# Patient Record
Sex: FEMALE | NOT HISPANIC OR LATINO | Employment: FULL TIME | URBAN - METROPOLITAN AREA
[De-identification: names, ages, dates, MRNs, and addresses within clinical notes are randomized per-mention and may not be internally consistent; named-entity substitution may affect disease eponyms.]

---

## 2017-05-23 DIAGNOSIS — J06.9 ACUTE URI: ICD-10-CM

## 2017-05-24 RX ORDER — FLUTICASONE PROPIONATE 50 MCG
SPRAY, SUSPENSION (ML) NASAL
Qty: 16 G | Refills: 5 | Status: SHIPPED | OUTPATIENT
Start: 2017-05-24 | End: 2020-11-10

## 2017-06-27 DIAGNOSIS — B37.9 YEAST INFECTION: ICD-10-CM

## 2017-06-27 RX ORDER — FLUCONAZOLE 100 MG/1
TABLET ORAL
Qty: 3 TABLET | Refills: 6 | Status: SHIPPED | OUTPATIENT
Start: 2017-06-27 | End: 2017-08-10

## 2017-08-07 DIAGNOSIS — G43.009 NONINTRACTABLE MIGRAINE, UNSPECIFIED MIGRAINE TYPE: ICD-10-CM

## 2017-08-07 RX ORDER — SUMATRIPTAN 50 MG/1
TABLET, FILM COATED ORAL
Qty: 30 TABLET | Refills: 5 | Status: ON HOLD | OUTPATIENT
Start: 2017-08-07 | End: 2021-03-01

## 2017-08-10 ENCOUNTER — OFFICE VISIT (OUTPATIENT)
Dept: FAMILY MEDICINE CLINIC | Facility: CLINIC | Age: 22
End: 2017-08-10

## 2017-08-10 VITALS
BODY MASS INDEX: 26.42 KG/M2 | DIASTOLIC BLOOD PRESSURE: 78 MMHG | WEIGHT: 168.3 LBS | SYSTOLIC BLOOD PRESSURE: 126 MMHG | HEART RATE: 94 BPM | OXYGEN SATURATION: 91 % | HEIGHT: 67 IN | TEMPERATURE: 97.9 F

## 2017-08-10 DIAGNOSIS — R10.13 EPIGASTRIC PAIN: Primary | ICD-10-CM

## 2017-08-10 PROCEDURE — 99214 OFFICE O/P EST MOD 30 MIN: CPT | Performed by: NURSE PRACTITIONER

## 2017-08-10 NOTE — PROGRESS NOTES
Subjective   Chloe Limon is a 22 y.o. female.     Abdominal Pain   This is a new problem. Episode onset: 3 days ago. The onset quality is sudden. The problem occurs constantly. The problem has been unchanged. The pain is located in the epigastric region. The pain is at a severity of 8/10. The quality of the pain is sharp and a sensation of fullness. The abdominal pain radiates to the epigastric region. Associated symptoms include belching (more this am). Pertinent negatives include no constipation, diarrhea, flatus, nausea or vomiting. Nothing aggravates the pain. The pain is relieved by nothing. She has tried antacids for the symptoms. The treatment provided no relief. was told she had a dueodenal ulcer afte having a cholecystectomy     I have reviewed the patient's medical history in detail and updated the computerized patient record.    The following portions of the patient's history were reviewed and updated as appropriate: allergies, current medications, past family history, past medical history, past social history, past surgical history and problem list.      Current Outpatient Prescriptions:   •  fluticasone (FLONASE) 50 MCG/ACT nasal spray, INHALE ONE SPRAY INTO EACH NOSTRIL TWICE DAILY, Disp: 16 g, Rfl: 5  •  SPRINTEC 28 0.25-35 MG-MCG per tablet, TAKE 1 TABLET BY MOUTH EVERY DAY, Disp: , Rfl: 12  •  SUMAtriptan (IMITREX) 50 MG tablet, TAKE ONE TABLET BY MOUTH AT ONSET OF HEADACHE MAY REPEAT DOSE ONE TIME IN TWO HOURS IF HEADACHE NOT RELIEVED, Disp: 30 tablet, Rfl: 5     Review of Systems   Constitutional: Negative.    Respiratory: Negative.    Cardiovascular: Negative.    Gastrointestinal: Positive for abdominal pain. Negative for abdominal distention, anal bleeding, constipation, diarrhea, flatus, nausea and vomiting.   Skin: Negative.    Neurological: Negative.        Objective      Current Outpatient Prescriptions:   •  fluticasone (FLONASE) 50 MCG/ACT nasal spray, INHALE ONE SPRAY INTO EACH  NOSTRIL TWICE DAILY, Disp: 16 g, Rfl: 5  •  SPRINTEC 28 0.25-35 MG-MCG per tablet, TAKE 1 TABLET BY MOUTH EVERY DAY, Disp: , Rfl: 12  •  SUMAtriptan (IMITREX) 50 MG tablet, TAKE ONE TABLET BY MOUTH AT ONSET OF HEADACHE MAY REPEAT DOSE ONE TIME IN TWO HOURS IF HEADACHE NOT RELIEVED, Disp: 30 tablet, Rfl: 5     Physical Exam   Constitutional: She is oriented to person, place, and time. She appears well-developed and well-nourished.   Cardiovascular: Normal rate, regular rhythm and normal heart sounds.    Pulmonary/Chest: Effort normal and breath sounds normal.   Abdominal: Soft. Bowel sounds are normal. There is no splenomegaly or hepatomegaly. There is tenderness in the epigastric area. There is no rigidity, no rebound, no guarding, no CVA tenderness, no tenderness at McBurney's point and negative Chahal's sign.   Neurological: She is alert and oriented to person, place, and time.   Skin: Skin is warm and dry.   Psychiatric:   No acute distress   Vitals reviewed.      Assessment/Plan   Chloe was seen today for abdominal pain and abnormal imaging.    Diagnoses and all orders for this visit:    Epigastric pain  -     Cancel: Ambulatory referral for Screening EGD  -     FL upper gi w air contrast and kub       1. Positive for epigastric tenderness. History of cholecystectomy was told at that time she had a perforated duodenal ulcer. I am sending her for an UGI with small bowel follow through. Will adjust plan of care once the results are in.  2. Follow up as needed.

## 2017-08-15 DIAGNOSIS — L74.510 HYPERHIDROSIS OF AXILLA: ICD-10-CM

## 2017-08-16 RX ORDER — ALUMINUM CHLORIDE 20 %
SOLUTION, NON-ORAL TOPICAL
Qty: 60 ML | Refills: 5 | Status: SHIPPED | OUTPATIENT
Start: 2017-08-16 | End: 2018-09-28 | Stop reason: SDUPTHER

## 2017-08-21 ENCOUNTER — HOSPITAL ENCOUNTER (OUTPATIENT)
Dept: GENERAL RADIOLOGY | Facility: HOSPITAL | Age: 22
Discharge: HOME | End: 2017-08-21
Admitting: NURSE PRACTITIONER

## 2017-08-21 PROCEDURE — 74247: CPT

## 2017-08-24 ENCOUNTER — TELEPHONE (OUTPATIENT)
Dept: FAMILY MEDICINE CLINIC | Facility: CLINIC | Age: 22
End: 2017-08-24

## 2017-08-24 NOTE — TELEPHONE ENCOUNTER
I called Chloe back and discussed the results of her UGI. I have advised her to follow up with the gastroenterologist she has seen in the past.

## 2018-09-28 DIAGNOSIS — L74.510 HYPERHIDROSIS OF AXILLA: ICD-10-CM

## 2018-10-01 RX ORDER — ALUMINUM CHLORIDE 20 %
SOLUTION, NON-ORAL TOPICAL
Qty: 60 ML | Refills: 5 | Status: ON HOLD | OUTPATIENT
Start: 2018-10-01 | End: 2021-03-01

## 2020-03-04 ENCOUNTER — OFFICE VISIT (OUTPATIENT)
Dept: FAMILY MEDICINE CLINIC | Facility: CLINIC | Age: 25
End: 2020-03-04

## 2020-03-04 VITALS
SYSTOLIC BLOOD PRESSURE: 126 MMHG | TEMPERATURE: 98.1 F | DIASTOLIC BLOOD PRESSURE: 82 MMHG | RESPIRATION RATE: 18 BRPM | HEART RATE: 134 BPM | HEIGHT: 67 IN | BODY MASS INDEX: 26.21 KG/M2 | OXYGEN SATURATION: 98 % | WEIGHT: 167 LBS

## 2020-03-04 DIAGNOSIS — J01.40 ACUTE NON-RECURRENT PANSINUSITIS: Primary | ICD-10-CM

## 2020-03-04 PROCEDURE — 99213 OFFICE O/P EST LOW 20 MIN: CPT | Performed by: NURSE PRACTITIONER

## 2020-03-04 RX ORDER — POLYMYXIN B SULFATE AND TRIMETHOPRIM 1; 10000 MG/ML; [USP'U]/ML
SOLUTION OPHTHALMIC
COMMUNITY
Start: 2020-01-28 | End: 2020-05-20

## 2020-03-04 RX ORDER — PRENATAL VIT NO.126/IRON/FOLIC 28MG-0.8MG
TABLET ORAL DAILY
COMMUNITY
End: 2022-01-14

## 2020-03-04 RX ORDER — AMOXICILLIN AND CLAVULANATE POTASSIUM 875; 125 MG/1; MG/1
1 TABLET, FILM COATED ORAL 2 TIMES DAILY
Qty: 14 TABLET | Refills: 0 | Status: SHIPPED | OUTPATIENT
Start: 2020-03-04 | End: 2020-03-11

## 2020-03-04 RX ORDER — FLUCONAZOLE 100 MG/1
100 TABLET ORAL DAILY
Qty: 3 TABLET | Refills: 0 | Status: SHIPPED | OUTPATIENT
Start: 2020-03-04 | End: 2020-03-07

## 2020-03-04 NOTE — PROGRESS NOTES
Subjective   Chloe Ugalde is a 24 y.o. female.     Chief Complaint   Patient presents with   • URI     Sx      URI    This is a new problem. The current episode started in the past 7 days. The problem has been unchanged. There has been no fever. Associated symptoms include congestion, coughing, headaches, a plugged ear sensation, rhinorrhea, sinus pain and a sore throat. Pertinent negatives include no sneezing. She has tried decongestant and acetaminophen for the symptoms. The treatment provided mild relief.      I have reviewed the patient's medical history in detail and updated the computerized patient record.    The following portions of the patient's history were reviewed and updated as appropriate: allergies, current medications, past family history, past medical history, past social history, past surgical history and problem list.       Current Outpatient Medications:   •  DRYSOL 20 % external solution, APPLY TOPICALLY AS NEEDED (HYPERHIDROSIS) FOR UP TO 30 DAYS, Disp: 60 mL, Rfl: 5  •  Prenatal Vit-Fe Fumarate-FA (PRENATAL, CLASSIC, VITAMIN) 28-0.8 MG tablet tablet, Take  by mouth Daily., Disp: , Rfl:   •  SUMAtriptan (IMITREX) 50 MG tablet, TAKE ONE TABLET BY MOUTH AT ONSET OF HEADACHE MAY REPEAT DOSE ONE TIME IN TWO HOURS IF HEADACHE NOT RELIEVED, Disp: 30 tablet, Rfl: 5  •  trimethoprim-polymyxin b (POLYTRIM) 74732-2.1 UNIT/ML-% ophthalmic solution, INSTILL ONE DROP INTO AFFECTED EYE every THREE hours FOR SEVEN DAYS, Disp: , Rfl:   •  amoxicillin-clavulanate (AUGMENTIN) 875-125 MG per tablet, Take 1 tablet by mouth 2 (Two) Times a Day for 7 days., Disp: 14 tablet, Rfl: 0  •  fluconazole (DIFLUCAN) 100 MG tablet, Take 1 tablet by mouth Daily for 3 days., Disp: 3 tablet, Rfl: 0  •  fluticasone (FLONASE) 50 MCG/ACT nasal spray, INHALE ONE SPRAY INTO EACH NOSTRIL TWICE DAILY, Disp: 16 g, Rfl: 5    Review of Systems   Constitutional: Positive for fatigue. Negative for chills and fever.   HENT: Positive for  "congestion, rhinorrhea, sinus pressure and sore throat. Negative for sneezing.    Respiratory: Positive for cough.    Musculoskeletal: Positive for myalgias (mild).   Neurological: Positive for headache.       Objective      Vitals:    03/04/20 0906   BP: 126/82   BP Location: Left arm   Patient Position: Sitting   Cuff Size: Adult   Pulse: (!) 134   Resp: 18   Temp: 98.1 °F (36.7 °C)   TempSrc: Oral   SpO2: 98%   Weight: 75.8 kg (167 lb)   Height: 170.2 cm (67\")     Physical Exam   Constitutional: She is oriented to person, place, and time. She appears well-developed and well-nourished.   HENT:   Right Ear: Tympanic membrane normal.   Left Ear: Tympanic membrane normal.   Nose: Mucosal edema and congestion present. No rhinorrhea. Right sinus exhibits maxillary sinus tenderness and frontal sinus tenderness. Left sinus exhibits maxillary sinus tenderness and frontal sinus tenderness.   Mouth/Throat: Oropharynx is clear and moist.   Cardiovascular: Normal rate, regular rhythm, normal heart sounds and intact distal pulses.   Pulmonary/Chest: Effort normal and breath sounds normal. She has no wheezes. She has no rales.   Neurological: She is alert and oriented to person, place, and time.   Skin: Skin is warm and dry.         Assessment/Plan   Chloe was seen today for uri.    Diagnoses and all orders for this visit:    Acute non-recurrent pansinusitis  -     fluconazole (DIFLUCAN) 100 MG tablet; Take 1 tablet by mouth Daily for 3 days.  -     amoxicillin-clavulanate (AUGMENTIN) 875-125 MG per tablet; Take 1 tablet by mouth 2 (Two) Times a Day for 7 days.      You have been diagnosed with acute sinusitis. You have been prescribed an antibiotic along with symptomatic treatment.  You may take Mucinex D for relieving congestion and cough.  If you have high blood pressure, do not take Mucinex D, instead opting for plain Mucinex and Coricidin HBP.  Take Ibuprofen or Tylenol as needed for pain or fever.  Oral antihistamine, " such as Zyrtec or Claritin may help reduce ear pressure and relieve some nasal symptoms.  A saline nasal spray may be used to keep nose clear from discharge.  Be sure that you are increasing your intake of clear to decaffeinated fluids and get plenty of rest.  If your symptoms worsen or persist follow up as needed.

## 2020-05-13 DIAGNOSIS — Z00.00 ROUTINE GENERAL MEDICAL EXAMINATION AT A HEALTH CARE FACILITY: Primary | ICD-10-CM

## 2020-05-13 LAB
ALBUMIN SERPL-MCNC: 4.7 G/DL (ref 3.5–5.2)
ALBUMIN/GLOB SERPL: 2 G/DL
ALP SERPL-CCNC: 66 U/L (ref 39–117)
ALT SERPL-CCNC: 10 U/L (ref 1–33)
AST SERPL-CCNC: 17 U/L (ref 1–32)
BILIRUB SERPL-MCNC: 0.4 MG/DL (ref 0.2–1.2)
BUN SERPL-MCNC: 11 MG/DL (ref 6–20)
BUN/CREAT SERPL: 15.7 (ref 7–25)
CALCIUM SERPL-MCNC: 9.3 MG/DL (ref 8.6–10.5)
CHLORIDE SERPL-SCNC: 103 MMOL/L (ref 98–107)
CHOLEST SERPL-MCNC: 139 MG/DL (ref 0–200)
CO2 SERPL-SCNC: 24.7 MMOL/L (ref 22–29)
CREAT SERPL-MCNC: 0.7 MG/DL (ref 0.57–1)
ERYTHROCYTE [DISTWIDTH] IN BLOOD BY AUTOMATED COUNT: 13.1 % (ref 12.3–15.4)
GLOBULIN SER CALC-MCNC: 2.3 GM/DL
GLUCOSE SERPL-MCNC: 107 MG/DL (ref 65–99)
HCT VFR BLD AUTO: 39.5 % (ref 34–46.6)
HDLC SERPL-MCNC: 72 MG/DL (ref 40–60)
HGB BLD-MCNC: 13.5 G/DL (ref 12–15.9)
LDLC SERPL CALC-MCNC: 57 MG/DL (ref 0–100)
MCH RBC QN AUTO: 30.8 PG (ref 26.6–33)
MCHC RBC AUTO-ENTMCNC: 34.2 G/DL (ref 31.5–35.7)
MCV RBC AUTO: 90.2 FL (ref 79–97)
PLATELET # BLD AUTO: 234 10*3/MM3 (ref 140–450)
POTASSIUM SERPL-SCNC: 4.7 MMOL/L (ref 3.5–5.2)
PROT SERPL-MCNC: 7 G/DL (ref 6–8.5)
RBC # BLD AUTO: 4.38 10*6/MM3 (ref 3.77–5.28)
SODIUM SERPL-SCNC: 138 MMOL/L (ref 136–145)
TRIGL SERPL-MCNC: 50 MG/DL (ref 0–150)
VLDLC SERPL CALC-MCNC: 10 MG/DL
WBC # BLD AUTO: 6.31 10*3/MM3 (ref 3.4–10.8)

## 2020-05-20 ENCOUNTER — OFFICE VISIT (OUTPATIENT)
Dept: FAMILY MEDICINE CLINIC | Facility: CLINIC | Age: 25
End: 2020-05-20

## 2020-05-20 VITALS
HEIGHT: 67 IN | BODY MASS INDEX: 27.31 KG/M2 | OXYGEN SATURATION: 100 % | HEART RATE: 89 BPM | DIASTOLIC BLOOD PRESSURE: 68 MMHG | WEIGHT: 174 LBS | TEMPERATURE: 98.7 F | SYSTOLIC BLOOD PRESSURE: 114 MMHG

## 2020-05-20 DIAGNOSIS — Z00.00 ENCOUNTER FOR PREVENTIVE HEALTH EXAMINATION: Primary | ICD-10-CM

## 2020-05-20 PROCEDURE — 99395 PREV VISIT EST AGE 18-39: CPT | Performed by: NURSE PRACTITIONER

## 2020-05-20 NOTE — PROGRESS NOTES
Subjective   Chloe Ugalde is a 25 y.o. female.     Chief Complaint   Patient presents with   • Annual Exam     PHE & review labs     Ms. Ugalde presents today for her annual physical exam with lab review. She reports she is doing well and has no concerns today.        I have reviewed the patient's medical history in detail and updated the computerized patient record.    The following portions of the patient's history were reviewed and updated as appropriate: allergies, current medications, past family history, past medical history, past social history, past surgical history and problem list.      Current Outpatient Medications:   •  DRYSOL 20 % external solution, APPLY TOPICALLY AS NEEDED (HYPERHIDROSIS) FOR UP TO 30 DAYS, Disp: 60 mL, Rfl: 5  •  fluticasone (FLONASE) 50 MCG/ACT nasal spray, INHALE ONE SPRAY INTO EACH NOSTRIL TWICE DAILY, Disp: 16 g, Rfl: 5  •  Prenatal Vit-Fe Fumarate-FA (PRENATAL, CLASSIC, VITAMIN) 28-0.8 MG tablet tablet, Take  by mouth Daily., Disp: , Rfl:   •  SUMAtriptan (IMITREX) 50 MG tablet, TAKE ONE TABLET BY MOUTH AT ONSET OF HEADACHE MAY REPEAT DOSE ONE TIME IN TWO HOURS IF HEADACHE NOT RELIEVED, Disp: 30 tablet, Rfl: 5     Review of Systems   Constitutional: Negative.    Respiratory: Negative.    Cardiovascular: Negative.    Gastrointestinal: Negative.    Endocrine: Negative.    Musculoskeletal: Negative.    Neurological: Positive for headache (controled).   All other systems reviewed and are negative.      Objective    Vitals:    05/20/20 0910   BP: 114/68   Pulse: 89   Temp: 98.7 °F (37.1 °C)   SpO2: 100%     Physical Exam   Constitutional: She is oriented to person, place, and time. She appears well-developed and well-nourished.   HENT:   Right Ear: Tympanic membrane normal.   Left Ear: Tympanic membrane normal.   Neck: Normal range of motion. Neck supple. No thyromegaly present.   Cardiovascular: Normal rate, regular rhythm and normal heart sounds.   Pulmonary/Chest: Effort  normal and breath sounds normal.   Abdominal: Soft. Bowel sounds are normal.   Musculoskeletal: Normal range of motion. She exhibits no edema.   Lymphadenopathy:     She has no cervical adenopathy.   Neurological: She is alert and oriented to person, place, and time.   Skin: Skin is warm and dry.   Psychiatric:   No acute distress         Assessment/Plan   Chloe was seen today for annual exam.    Diagnoses and all orders for this visit:    Encounter for preventive health examination    Ms. Ugalde's physical assessment is within normal parameters today.   I have reviewed her labs. All of her labs are normal.   She is to continue all medications as prescribed.   We discussed eating a healthy diet and getting regular exercise to help with her glucose control since she show's signs in the past of PCOS.  She is to follow up as needed and in 1 year.

## 2020-09-24 LAB
EXTERNAL HEPATITIS B SURFACE ANTIGEN: NEGATIVE
EXTERNAL HEPATITIS C AB: NORMAL
EXTERNAL RUBELLA QUALITATIVE: NORMAL
EXTERNAL SYPHILIS RPR SCREEN: NORMAL
HIV1 P24 AG SERPL QL IA: NORMAL

## 2020-10-29 ENCOUNTER — TRANSCRIBE ORDERS (OUTPATIENT)
Dept: ULTRASOUND IMAGING | Facility: HOSPITAL | Age: 25
End: 2020-10-29

## 2020-10-29 DIAGNOSIS — R89.9 ABNORMAL LABORATORY TEST RESULT: Primary | ICD-10-CM

## 2020-11-10 DIAGNOSIS — J06.9 ACUTE URI: ICD-10-CM

## 2020-11-10 RX ORDER — FLUTICASONE PROPIONATE 50 MCG
SPRAY, SUSPENSION (ML) NASAL
Qty: 16 G | Refills: 5 | Status: ON HOLD | OUTPATIENT
Start: 2020-11-10 | End: 2021-03-01

## 2020-11-13 ENCOUNTER — OFFICE VISIT (OUTPATIENT)
Dept: OBSTETRICS AND GYNECOLOGY | Facility: CLINIC | Age: 25
End: 2020-11-13

## 2020-11-13 ENCOUNTER — HOSPITAL ENCOUNTER (OUTPATIENT)
Dept: ULTRASOUND IMAGING | Facility: HOSPITAL | Age: 25
Discharge: HOME | End: 2020-11-13
Admitting: OBSTETRICS & GYNECOLOGY

## 2020-11-13 VITALS
WEIGHT: 183 LBS | TEMPERATURE: 98.2 F | SYSTOLIC BLOOD PRESSURE: 131 MMHG | DIASTOLIC BLOOD PRESSURE: 81 MMHG | BODY MASS INDEX: 28.72 KG/M2 | HEART RATE: 82 BPM | HEIGHT: 67 IN

## 2020-11-13 DIAGNOSIS — Z13.79 GENETIC SCREENING: Primary | ICD-10-CM

## 2020-11-13 DIAGNOSIS — R89.9 ABNORMAL LABORATORY TEST RESULT: ICD-10-CM

## 2020-11-13 DIAGNOSIS — R73.02 IMPAIRED GLUCOSE TOLERANCE: ICD-10-CM

## 2020-11-13 DIAGNOSIS — G43.109 MIGRAINE AURA WITHOUT HEADACHE: ICD-10-CM

## 2020-11-13 DIAGNOSIS — Z91.89 AT RISK FOR HYPERTENSION: ICD-10-CM

## 2020-11-13 PROCEDURE — 76805 OB US >/= 14 WKS SNGL FETUS: CPT

## 2020-11-13 PROCEDURE — 76805 OB US >/= 14 WKS SNGL FETUS: CPT | Performed by: OBSTETRICS & GYNECOLOGY

## 2020-11-13 PROCEDURE — 99243 OFF/OP CNSLTJ NEW/EST LOW 30: CPT | Performed by: OBSTETRICS & GYNECOLOGY

## 2020-11-13 NOTE — PROGRESS NOTES
MATERNAL FETAL MEDICINE OUTPATIENT NOTE     Dear Dr Turcios    This is a  new visit.     Thank you for requesting my service to provide consultation for Chloe Ugalde is a 25 y.o.   at 14 6/7 weeks gestation (Estimated Date of Delivery: 20.).   She is being seen for counseling regarding abnormal antibody testing.   Denies history of transfusion or SAB.   RH positive.     Today, she denies headache, blurry vision, RUQ pain. No vaginal bleeding, no contractions.     Chief complaint  .    ICD-10-CM ICD-9-CM   1. Genetic screening  Z13.79 V82.79   2. Abnormal laboratory test result - nonspecific antibody screen  R89.9 796.4   3. At risk for hypertension  Z91.89 V15.89   4. Impaired glucose tolerance (pre-diabetic HbA1c = 6% in 2016)  R73.02 790.22   5. Migraine aura without headache  G43.109 346.00       Past obstetric, gynecological, medical, surgical, family and social history reviewed; no changes made.     Review of systems  Constitutional:  No Weight Change, No Fever, No Chills, No Fatigue, No Malaise  ENT/Mouth:  No Hearing Changes, No Sinus Pain, No Hoarseness, No sore throat, No   Eyes:  No Eye Pain, No Vision Changes  Cardiovascular:  No Chest Pain, No SOB, No Palpitations  Respiratory:  No Cough, No Wheezing, No Dyspnea  Gastrointestinal:  No Nausea, No Vomiting, No Diarrhea, No Constipation, No Pain   Genitourinary:   No Dysuria, No Urinary Frequency, No Hematuria, No Flank Pain  Musculoskeletal:  No Arthralgias, No Myalgias,   Skin:  No Skin Lesions, No Pruritis,   Neuro:  No Weakness, No Numbness, No Loss of Consciousness, No Syncope  Psych:  No Memory Changes, No Violence/Abuse Hx., No Eating Concerns  Heme/Lymph:  No Bruising, No Bleeding  Endocrine:   No Temperature Intolerance    Vitals:    20 1154 20 1239   BP: 144/81 131/81   BP Location: Right arm Right arm   Patient Position: Sitting Sitting   Cuff Size: Adult Adult   Pulse: 94 82   Temp: 98.2 °F (36.8 °C)    TempSrc:  "Infrared    Weight: 83 kg (183 lb)    Height: 170.2 cm (67\")        PHYSICAL EXAM   GENERAL: Not in acute distress, gravid   NEURO: awake, alert and oriented to person, place, and time  ABDOMINAL: No fundal tenderness, no rebound or guarding   EXTREMITIES: Bilaterally lower extremities No edema, no tenderness  PELVIC: No obvious vaginal discharge, no bleeding    ULTRASOUND   Please view full ultrasound note on Imaging tab in [employer].      ASSESSMENT   25 y.o.   at 14 6/7 weeks gestation (Estimated Date of Delivery: 20.), reassuring fetal and maternal status.     1. Single live intrauterine pregnancy   [ X ] stable  [   ] improving [  ] worsening    2. Abnormal laboratory testing - positive antibody screen   NEW finding    Labs results reviewed. Presence of antibody too low to determine which antibody possibly present and too low to titer. Lab recommended repeat testing in 4 weeks approximately.     We discussed the physiology of blood group isoimmunization and the mechanisms by which one can develop antibodies. The presence of an irregular antibody does not usually cause problems in the index pregnancy (with the exception of [place]), but, in general, a titer of over 1:16 in the Rhesus group (CcDdEe) should trigger evaluation of  the paternal blood group and Rh genotype. It is unclear regarding her testing and therefore should be repeated as above.     3. At risk for hypertension   Elevated /81    Recommend home blood pressures cuff for monitoring several times weekly. Report any BP > 140/90 to primary OB.    4. History of migraines per chart  [ X ] stable  [   ] improving [  ] worsening    Medication list = Sumatriptan.   Per evidence based medicine, caution using triptans in pregnancy because of possible \"selectively vasoconstrict brain vessels, but there is a theoretic possibility of vasoconstriction of uteroplacental vessels and increased uterotonic activity.\" [Headache. 2000;40(1):20. " ]        PLAN  -Fetal anatomy survey 18 - 20 weeks gestation   -After anatomy survey - Serial growth ultrasounds every 4- 6 weeks with primary OB  -Primary OB should repeat antibody screen in approximately 4 weeks   -Return to M office if antibody screen remains abnormal  -Starting at 28 weeks: Fetal movement instructions given continue daily until delivery; instructed to report to labor and delivery if cannot achieve more than 10 kicks in one hour or if she perceives a decrease in fetal movement        This note has been routed to the referring obstetricians/medical provider.   Thank you for your clinical consult.     KAYLEIGH CHAN MD MPH FACOG  MATERNAL FETAL MEDICINE

## 2020-12-17 ENCOUNTER — TRANSCRIBE ORDERS (OUTPATIENT)
Dept: ADMINISTRATIVE | Facility: HOSPITAL | Age: 25
End: 2020-12-17

## 2020-12-17 ENCOUNTER — LAB (OUTPATIENT)
Dept: LAB | Facility: HOSPITAL | Age: 25
End: 2020-12-17

## 2020-12-17 DIAGNOSIS — Z34.00 NORMAL FIRST PREGNANCY WITH UNCERTAIN DATE OF LAST MENSTRUAL PERIOD, ANTEPARTUM: ICD-10-CM

## 2020-12-17 DIAGNOSIS — Z34.00 NORMAL FIRST PREGNANCY WITH UNCERTAIN DATE OF LAST MENSTRUAL PERIOD, ANTEPARTUM: Primary | ICD-10-CM

## 2020-12-17 LAB
ABO GROUP BLD: NORMAL
BLD GP AB SCN SERPL QL: NEGATIVE
RH BLD: POSITIVE
T&S EXPIRATION DATE: NORMAL

## 2020-12-17 PROCEDURE — 86850 RBC ANTIBODY SCREEN: CPT

## 2020-12-17 PROCEDURE — 36415 COLL VENOUS BLD VENIPUNCTURE: CPT

## 2020-12-17 PROCEDURE — 86901 BLOOD TYPING SEROLOGIC RH(D): CPT

## 2020-12-17 PROCEDURE — 86900 BLOOD TYPING SEROLOGIC ABO: CPT

## 2021-02-18 LAB — EXTERNAL GTT 1 HOUR: 121

## 2021-03-01 ENCOUNTER — HOSPITAL ENCOUNTER (OUTPATIENT)
Facility: HOSPITAL | Age: 26
End: 2021-03-01
Attending: OBSTETRICS & GYNECOLOGY | Admitting: OBSTETRICS & GYNECOLOGY

## 2021-03-01 ENCOUNTER — HOSPITAL ENCOUNTER (OUTPATIENT)
Facility: HOSPITAL | Age: 26
Discharge: HOME | End: 2021-03-01
Attending: OBSTETRICS & GYNECOLOGY | Admitting: OBSTETRICS & GYNECOLOGY

## 2021-03-01 VITALS
BODY MASS INDEX: 32.02 KG/M2 | RESPIRATION RATE: 18 BRPM | SYSTOLIC BLOOD PRESSURE: 129 MMHG | HEIGHT: 67 IN | WEIGHT: 204 LBS | TEMPERATURE: 98.1 F | HEART RATE: 99 BPM | DIASTOLIC BLOOD PRESSURE: 79 MMHG

## 2021-03-01 PROBLEM — O47.9 IRREGULAR CONTRACTIONS: Status: ACTIVE | Noted: 2021-03-01

## 2021-03-01 LAB
ALBUMIN SERPL-MCNC: 3.4 G/DL (ref 3.5–5.2)
ALBUMIN/GLOB SERPL: 1.2 G/DL
ALP SERPL-CCNC: 81 U/L (ref 39–117)
ALT SERPL W P-5'-P-CCNC: 12 U/L (ref 1–33)
ANION GAP SERPL CALCULATED.3IONS-SCNC: 10.6 MMOL/L (ref 5–15)
AST SERPL-CCNC: 17 U/L (ref 1–32)
BILIRUB SERPL-MCNC: 0.2 MG/DL (ref 0–1.2)
BUN SERPL-MCNC: 5 MG/DL (ref 6–20)
BUN/CREAT SERPL: 11.1 (ref 7–25)
CALCIUM SPEC-SCNC: 8.5 MG/DL (ref 8.6–10.5)
CHLORIDE SERPL-SCNC: 107 MMOL/L (ref 98–107)
CO2 SERPL-SCNC: 18.4 MMOL/L (ref 22–29)
CREAT SERPL-MCNC: 0.45 MG/DL (ref 0.57–1)
DEPRECATED RDW RBC AUTO: 40.3 FL (ref 37–54)
ERYTHROCYTE [DISTWIDTH] IN BLOOD BY AUTOMATED COUNT: 13.2 % (ref 12.3–15.4)
GFR SERPL CREATININE-BSD FRML MDRD: >150 ML/MIN/1.73
GLOBULIN UR ELPH-MCNC: 2.9 GM/DL
GLUCOSE SERPL-MCNC: 76 MG/DL (ref 65–99)
HCT VFR BLD AUTO: 36.4 % (ref 34–46.6)
HGB BLD-MCNC: 12.6 G/DL (ref 12–15.9)
MCH RBC QN AUTO: 29.9 PG (ref 26.6–33)
MCHC RBC AUTO-ENTMCNC: 34.6 G/DL (ref 31.5–35.7)
MCV RBC AUTO: 86.5 FL (ref 79–97)
PLATELET # BLD AUTO: 242 10*3/MM3 (ref 140–450)
PMV BLD AUTO: 11.2 FL (ref 6–12)
POTASSIUM SERPL-SCNC: 3.8 MMOL/L (ref 3.5–5.2)
PROT SERPL-MCNC: 6.3 G/DL (ref 6–8.5)
RBC # BLD AUTO: 4.21 10*6/MM3 (ref 3.77–5.28)
SODIUM SERPL-SCNC: 136 MMOL/L (ref 136–145)
WBC # BLD AUTO: 11.64 10*3/MM3 (ref 3.4–10.8)

## 2021-03-01 PROCEDURE — 96372 THER/PROPH/DIAG INJ SC/IM: CPT

## 2021-03-01 PROCEDURE — 59025 FETAL NON-STRESS TEST: CPT

## 2021-03-01 PROCEDURE — 85027 COMPLETE CBC AUTOMATED: CPT | Performed by: OBSTETRICS & GYNECOLOGY

## 2021-03-01 PROCEDURE — 80053 COMPREHEN METABOLIC PANEL: CPT | Performed by: OBSTETRICS & GYNECOLOGY

## 2021-03-01 PROCEDURE — 59020 FETAL CONTRACT STRESS TEST: CPT

## 2021-03-01 PROCEDURE — G0378 HOSPITAL OBSERVATION PER HR: HCPCS

## 2021-03-01 PROCEDURE — G0463 HOSPITAL OUTPT CLINIC VISIT: HCPCS

## 2021-03-01 PROCEDURE — 25010000002 TERBUTALINE PER 1 MG: Performed by: OBSTETRICS & GYNECOLOGY

## 2021-03-01 RX ORDER — EPHEDRINE SULFATE 50 MG/ML
10 INJECTION, SOLUTION INTRAVENOUS
Status: DISCONTINUED | OUTPATIENT
Start: 2021-03-01 | End: 2021-03-01 | Stop reason: HOSPADM

## 2021-03-01 RX ORDER — TERBUTALINE SULFATE 1 MG/ML
0.25 INJECTION, SOLUTION SUBCUTANEOUS ONCE
Status: COMPLETED | OUTPATIENT
Start: 2021-03-01 | End: 2021-03-01

## 2021-03-01 RX ORDER — ONDANSETRON 2 MG/ML
4 INJECTION INTRAMUSCULAR; INTRAVENOUS ONCE AS NEEDED
Status: DISCONTINUED | OUTPATIENT
Start: 2021-03-01 | End: 2021-03-01 | Stop reason: HOSPADM

## 2021-03-01 RX ADMIN — SODIUM CHLORIDE, POTASSIUM CHLORIDE, SODIUM LACTATE AND CALCIUM CHLORIDE 500 ML: 600; 310; 30; 20 INJECTION, SOLUTION INTRAVENOUS at 13:12

## 2021-03-01 RX ADMIN — TERBUTALINE SULFATE 0.25 MG: 1 INJECTION SUBCUTANEOUS at 14:47

## 2021-03-01 NOTE — NURSING NOTE
Chloe Ugalde, a  at Unknown with an ELISA of Not found., was seen at [facility] for a nonstress test.    Chief Complaint   Patient presents with   • Contractions     overflow #15 pelvic pressure        Patient Active Problem List   Diagnosis   • Migraine without aura   • Irregular contractions       Start Time:1252   Stop Time: 1540    Interpretation A  Nonstress Test Interpretation A: Reactive (21 1547 : Therese Ramires RN)

## 2021-03-24 ENCOUNTER — HOSPITAL ENCOUNTER (OUTPATIENT)
Facility: HOSPITAL | Age: 26
End: 2021-03-24
Attending: OBSTETRICS & GYNECOLOGY | Admitting: OBSTETRICS & GYNECOLOGY

## 2021-03-24 ENCOUNTER — HOSPITAL ENCOUNTER (EMERGENCY)
Facility: HOSPITAL | Age: 26
Discharge: HOME | End: 2021-03-24
Attending: OBSTETRICS & GYNECOLOGY | Admitting: OBSTETRICS & GYNECOLOGY

## 2021-03-24 VITALS
OXYGEN SATURATION: 99 % | HEART RATE: 110 BPM | TEMPERATURE: 98.7 F | DIASTOLIC BLOOD PRESSURE: 76 MMHG | WEIGHT: 208 LBS | BODY MASS INDEX: 32.65 KG/M2 | HEIGHT: 67 IN | RESPIRATION RATE: 16 BRPM | SYSTOLIC BLOOD PRESSURE: 122 MMHG

## 2021-03-24 LAB
BACTERIA UR QL AUTO: NORMAL /HPF
BILIRUB UR QL STRIP: NEGATIVE
CLARITY UR: CLEAR
COLOR UR: YELLOW
GLUCOSE UR STRIP-MCNC: NEGATIVE MG/DL
HGB UR QL STRIP.AUTO: NEGATIVE
HYALINE CASTS UR QL AUTO: NORMAL /LPF
KETONES UR QL STRIP: NEGATIVE
LEUKOCYTE ESTERASE UR QL STRIP.AUTO: ABNORMAL
NITRITE UR QL STRIP: NEGATIVE
PH UR STRIP.AUTO: 6.5 [PH] (ref 5–8)
PROT UR QL STRIP: NEGATIVE
RBC # UR: NORMAL /HPF
REF LAB TEST METHOD: NORMAL
SP GR UR STRIP: <=1.005 (ref 1–1.03)
SQUAMOUS #/AREA URNS HPF: NORMAL /HPF
UROBILINOGEN UR QL STRIP: ABNORMAL
WBC UR QL AUTO: NORMAL /HPF

## 2021-03-24 PROCEDURE — 99284 EMERGENCY DEPT VISIT MOD MDM: CPT | Performed by: OBSTETRICS & GYNECOLOGY

## 2021-03-24 PROCEDURE — 81001 URINALYSIS AUTO W/SCOPE: CPT | Performed by: OBSTETRICS & GYNECOLOGY

## 2021-03-24 PROCEDURE — 59025 FETAL NON-STRESS TEST: CPT

## 2021-03-24 RX ORDER — NIFEDIPINE 10 MG/1
10 CAPSULE ORAL EVERY 6 HOURS PRN
COMMUNITY
End: 2021-05-04 | Stop reason: HOSPADM

## 2021-03-24 RX ORDER — ACETAMINOPHEN 500 MG
1000 TABLET ORAL ONCE
Status: COMPLETED | OUTPATIENT
Start: 2021-03-24 | End: 2021-03-24

## 2021-03-24 RX ADMIN — ACETAMINOPHEN 1000 MG: 500 TABLET, FILM COATED ORAL at 21:54

## 2021-03-25 NOTE — OBED NOTES
[facility] [place]        Patient Name: Chloe Ugalde  YOB: 1995  MRN: [id number]  Admission Date: 3/24/2021  8:58 PM  Date of Service: 3/24/2021    Chief Complaint: Contractions (wendy throughout today.  have gotten progressively stronger and more frequent.  denies LOF or VB>  +FM. )        Subjective     Chloe Ugalde is a 25 y.o. female  at 33w4d with Estimated Date of Delivery: 21 who presents with the chief complaint listed above.  She sees Trinh Turcios MD for her prenatal care. Her pregnancy has been complicated by:  migraines.    Patient has been having irregular contractions off and on for past month.  Her doctor gave her procardia to take PRN and she reports taking one of these today.  She reports it didn't help so was advised to come in for evaluation. She denies abdominal trauma, urinary symptoms, fever, or chills.    She describes fetal movement as normal.  She denies rupture of membranes.  She denies vaginal bleeding. She is feeling contractions.          Objective   Patient Active Problem List    Diagnosis    • Irregular contractions [O62.2]    • Migraine without aura [G43.009]         OB History    Para Term  AB Living   1 0 0 0 0 0   SAB TAB Ectopic Molar Multiple Live Births   0 0 0 0 0 0      # Outcome Date GA Lbr [place]/2nd Weight Sex Delivery Anes PTL Lv   1 Current                 Past Medical History:   Diagnosis Date   • Migraines    • Sepsis (CMS/[facility])        Past Surgical History:   Procedure Laterality Date   • CHOLECYSTECTOMY     • WISDOM TOOTH EXTRACTION         No current facility-administered medications on file prior to encounter.     Current Outpatient Medications on File Prior to Encounter   Medication Sig Dispense Refill   • NIFEdipine (PROCARDIA) 10 MG capsule Take 10 mg by mouth Every 6 (Six) Hours As Needed (contractions).     • Prenatal Vit-Fe Fumarate-FA (PRENATAL, CLASSIC, VITAMIN) 28-0.8 MG tablet tablet Take  by mouth  "Daily.         Allergies   Allergen Reactions   • Latex Hives     Blister per patient        Family History   Problem Relation Age of Onset   • Hypertension Father    • Diabetes Maternal Grandmother    • Diabetes Paternal Grandmother        Social History     Socioeconomic History   • Marital status:      Spouse name: Not on file   • Number of children: Not on file   • Years of education: Not on file   • Highest education level: Not on file   Tobacco Use   • Smoking status: Never Smoker   • Smokeless tobacco: Never Used   Vaping Use   • Vaping Use: Never used   Substance and Sexual Activity   • Alcohol use: Yes     Comment: occasional   • Drug use: No           Review of Systems   Constitutional: Negative for chills, fatigue and fever.   HENT: Negative for congestion, rhinorrhea and sore throat.    Eyes: Negative for visual disturbance.   Respiratory: Negative.    Cardiovascular: Negative.    Gastrointestinal: Positive for abdominal pain. Negative for constipation, diarrhea, nausea and vomiting.   Genitourinary: Negative for difficulty urinating, dyspareunia, dysuria, flank pain, frequency, genital sores, hematuria, pelvic pain, urgency, vaginal bleeding, vaginal discharge and vaginal pain.   Neurological: Negative for dizziness, seizures, light-headedness and headaches.   Psychiatric/Behavioral: Negative for sleep disturbance. The patient is not nervous/anxious.           PHYSICAL EXAM:      VITAL SIGNS:  Vitals:    03/24/21 2112 03/24/21 2116 03/24/21 2121 03/24/21 2127   BP: 133/77   122/76   Pulse: (!) 128  (!) 125 110   Resp:  16 16    Temp:   98.7 °F (37.1 °C)    TempSrc:   Oral    SpO2:   99%    Weight:       Height:  170.2 cm (67\")          FHT'S:                   Baseline:  130 BPM  Variability:  Moderate = 6 - 25 BPM  Accelerations:  15 x 15 accelerations present     Decelerations:  absent  Contractions:   present     Interpretation:    Reactive NST, CAT 1 tracing        PHYSICAL EXAM:    General: " "well developed; well nourished  no acute distress   Heart: Not performed.   Lungs  : breathing is unlabored     Abdomen: soft, non-tender; no masses  no umbilical or inguinal hernias are present  no hepato-splenomegaly       Cervix: was checked (by RN): 0 cm / 60 % / -1  Cervical Dilation (cm): 0  Cervical Effacement: 60%  Fetal Station: -1  Cervical Consistency: medium  Cervical Position: posterior   Contractions: irregular        Extremities: no pedal edema noted      LABS AND TESTING ORDERED:  1. Uterine and fetal monitoring  2. Urinalysis      LAB RESULTS:    No results found for this or any previous visit (from the past 24 hour(s)).    Lab Results   Component Value Date    ABO O 2020    RH Positive 2020       No results found for: STREPGPB              Assessment/Plan     ASSESSMENT/PLAN:  Chloe Ugalde is a 25 y.o. female  at 33w4d who presented with:  contractions.  Patient given hydration and observed.  Cervix reexamined showing no change.  Contractions dissipated.  She was discharged home with return precautions reviewed.          Final Impression:  • Pregnancy at 33w4d  • Reactive NST.  CAT 1 tracing  • False  labor  • Maternal vital signs were reviewed and were unremarkable              Vitals:    21 2112 21 21121 21221   BP: 133/77   122/76   Pulse: (!) 128  (!) 125 110   Resp:  16 16    Temp:   98.7 °F (37.1 °C)    TempSrc:   Oral    SpO2:   99%    Weight:       Height:  170.2 cm (67\")     •     • No results found for: STREPGPB  Lab Results   Component Value Date    ABO O 2020    RH Positive 2020   •   • COVID - 19 status unknown      PLAN:       I have spent 30 minutes including face to face time with the patient, greater than 50% in discussion of the diagnosis (counseling) and/or coordination of care.     Elly Barnes MD  3/24/2021  21:37 EDT  OB Hospitalist  Phone:  [phone]  "

## 2021-04-15 LAB — EXTERNAL GROUP B STREP ANTIGEN: NEGATIVE

## 2021-05-02 ENCOUNTER — HOSPITAL ENCOUNTER (INPATIENT)
Dept: LABOR AND DELIVERY | Facility: HOSPITAL | Age: 26
Discharge: HOME | End: 2021-05-02

## 2021-05-02 ENCOUNTER — HOSPITAL ENCOUNTER (INPATIENT)
Facility: HOSPITAL | Age: 26
LOS: 2 days | Discharge: HOME | End: 2021-05-04
Attending: OBSTETRICS & GYNECOLOGY | Admitting: OBSTETRICS & GYNECOLOGY

## 2021-05-02 ENCOUNTER — ANESTHESIA (OUTPATIENT)
Dept: LABOR AND DELIVERY | Facility: HOSPITAL | Age: 26
End: 2021-05-02

## 2021-05-02 ENCOUNTER — ANESTHESIA EVENT (OUTPATIENT)
Dept: LABOR AND DELIVERY | Facility: HOSPITAL | Age: 26
End: 2021-05-02

## 2021-05-02 PROBLEM — Z34.90 PREGNANCY: Status: ACTIVE | Noted: 2021-05-02

## 2021-05-02 LAB
ABO GROUP BLD: NORMAL
BASOPHILS # BLD AUTO: 0.05 10*3/MM3 (ref 0–0.2)
BASOPHILS NFR BLD AUTO: 0.5 % (ref 0–1.5)
BLD GP AB SCN SERPL QL: NEGATIVE
DEPRECATED RDW RBC AUTO: 43 FL (ref 37–54)
EOSINOPHIL # BLD AUTO: 0.23 10*3/MM3 (ref 0–0.4)
EOSINOPHIL NFR BLD AUTO: 2.1 % (ref 0.3–6.2)
ERYTHROCYTE [DISTWIDTH] IN BLOOD BY AUTOMATED COUNT: 13.2 % (ref 12.3–15.4)
HCT VFR BLD AUTO: 38.5 % (ref 34–46.6)
HGB BLD-MCNC: 13.2 G/DL (ref 12–15.9)
IMM GRANULOCYTES # BLD AUTO: 0.08 10*3/MM3 (ref 0–0.05)
IMM GRANULOCYTES NFR BLD AUTO: 0.7 % (ref 0–0.5)
LYMPHOCYTES # BLD AUTO: 2.51 10*3/MM3 (ref 0.7–3.1)
LYMPHOCYTES NFR BLD AUTO: 23.4 % (ref 19.6–45.3)
MCH RBC QN AUTO: 30.7 PG (ref 26.6–33)
MCHC RBC AUTO-ENTMCNC: 34.3 G/DL (ref 31.5–35.7)
MCV RBC AUTO: 89.5 FL (ref 79–97)
MONOCYTES # BLD AUTO: 0.91 10*3/MM3 (ref 0.1–0.9)
MONOCYTES NFR BLD AUTO: 8.5 % (ref 5–12)
NEUTROPHILS NFR BLD AUTO: 6.96 10*3/MM3 (ref 1.7–7)
NEUTROPHILS NFR BLD AUTO: 64.8 % (ref 42.7–76)
NRBC BLD AUTO-RTO: 0 /100 WBC (ref 0–0.2)
PLATELET # BLD AUTO: 272 10*3/MM3 (ref 140–450)
PMV BLD AUTO: 11.4 FL (ref 6–12)
RBC # BLD AUTO: 4.3 10*6/MM3 (ref 3.77–5.28)
RH BLD: POSITIVE
SARS-COV-2 RNA RESP QL NAA+PROBE: NOT DETECTED
T&S EXPIRATION DATE: NORMAL
WBC # BLD AUTO: 10.74 10*3/MM3 (ref 3.4–10.8)

## 2021-05-02 PROCEDURE — 85025 COMPLETE CBC W/AUTO DIFF WBC: CPT | Performed by: OBSTETRICS & GYNECOLOGY

## 2021-05-02 PROCEDURE — 25010000002 METHYLERGONOVINE MALEATE PER 0.2 MG

## 2021-05-02 PROCEDURE — 86900 BLOOD TYPING SEROLOGIC ABO: CPT | Performed by: OBSTETRICS & GYNECOLOGY

## 2021-05-02 PROCEDURE — 86850 RBC ANTIBODY SCREEN: CPT | Performed by: OBSTETRICS & GYNECOLOGY

## 2021-05-02 PROCEDURE — C1755 CATHETER, INTRASPINAL: HCPCS | Performed by: ANESTHESIOLOGY

## 2021-05-02 PROCEDURE — 0KQM0ZZ REPAIR PERINEUM MUSCLE, OPEN APPROACH: ICD-10-PCS | Performed by: OBSTETRICS & GYNECOLOGY

## 2021-05-02 PROCEDURE — C1755 CATHETER, INTRASPINAL: HCPCS

## 2021-05-02 PROCEDURE — 3E033VJ INTRODUCTION OF OTHER HORMONE INTO PERIPHERAL VEIN, PERCUTANEOUS APPROACH: ICD-10-PCS | Performed by: OBSTETRICS & GYNECOLOGY

## 2021-05-02 PROCEDURE — 10907ZC DRAINAGE OF AMNIOTIC FLUID, THERAPEUTIC FROM PRODUCTS OF CONCEPTION, VIA NATURAL OR ARTIFICIAL OPENING: ICD-10-PCS | Performed by: OBSTETRICS & GYNECOLOGY

## 2021-05-02 PROCEDURE — 86901 BLOOD TYPING SEROLOGIC RH(D): CPT | Performed by: OBSTETRICS & GYNECOLOGY

## 2021-05-02 PROCEDURE — U0003 INFECTIOUS AGENT DETECTION BY NUCLEIC ACID (DNA OR RNA); SEVERE ACUTE RESPIRATORY SYNDROME CORONAVIRUS 2 (SARS-COV-2) (CORONAVIRUS DISEASE [COVID-19]), AMPLIFIED PROBE TECHNIQUE, MAKING USE OF HIGH THROUGHPUT TECHNOLOGIES AS DESCRIBED BY CMS-2020-01-R: HCPCS | Performed by: OBSTETRICS & GYNECOLOGY

## 2021-05-02 RX ORDER — IBUPROFEN 600 MG/1
600 TABLET ORAL EVERY 8 HOURS PRN
Status: DISCONTINUED | OUTPATIENT
Start: 2021-05-02 | End: 2021-05-04 | Stop reason: HOSPADM

## 2021-05-02 RX ORDER — CALCIUM CARBONATE 200(500)MG
2 TABLET,CHEWABLE ORAL AS NEEDED
COMMUNITY
End: 2021-05-04 | Stop reason: HOSPADM

## 2021-05-02 RX ORDER — OXYTOCIN-SODIUM CHLORIDE 0.9% IV SOLN 30 UNIT/500ML 30-0.9/5 UT/ML-%
125 SOLUTION INTRAVENOUS CONTINUOUS PRN
Status: COMPLETED | OUTPATIENT
Start: 2021-05-02 | End: 2021-05-02

## 2021-05-02 RX ORDER — OXYTOCIN-SODIUM CHLORIDE 0.9% IV SOLN 30 UNIT/500ML 30-0.9/5 UT/ML-%
250 SOLUTION INTRAVENOUS CONTINUOUS PRN
Status: DISPENSED | OUTPATIENT
Start: 2021-05-02 | End: 2021-05-02

## 2021-05-02 RX ORDER — MAGNESIUM CARB/ALUMINUM HYDROX 105-160MG
30 TABLET,CHEWABLE ORAL ONCE AS NEEDED
Status: DISCONTINUED | OUTPATIENT
Start: 2021-05-02 | End: 2021-05-02 | Stop reason: HOSPADM

## 2021-05-02 RX ORDER — DIPHENHYDRAMINE HYDROCHLORIDE 50 MG/ML
12.5 INJECTION INTRAMUSCULAR; INTRAVENOUS EVERY 8 HOURS PRN
Status: DISCONTINUED | OUTPATIENT
Start: 2021-05-02 | End: 2021-05-02 | Stop reason: HOSPADM

## 2021-05-02 RX ORDER — OXYCODONE HYDROCHLORIDE AND ACETAMINOPHEN 5; 325 MG/1; MG/1
1 TABLET ORAL EVERY 4 HOURS PRN
Status: DISCONTINUED | OUTPATIENT
Start: 2021-05-02 | End: 2021-05-04 | Stop reason: HOSPADM

## 2021-05-02 RX ORDER — LIDOCAINE HYDROCHLORIDE AND EPINEPHRINE 15; 5 MG/ML; UG/ML
INJECTION, SOLUTION EPIDURAL AS NEEDED
Status: DISCONTINUED | OUTPATIENT
Start: 2021-05-02 | End: 2021-05-02 | Stop reason: SURG

## 2021-05-02 RX ORDER — SODIUM CHLORIDE 0.9 % (FLUSH) 0.9 %
10 SYRINGE (ML) INJECTION AS NEEDED
Status: DISCONTINUED | OUTPATIENT
Start: 2021-05-02 | End: 2021-05-02 | Stop reason: HOSPADM

## 2021-05-02 RX ORDER — OXYCODONE AND ACETAMINOPHEN 10; 325 MG/1; MG/1
1 TABLET ORAL EVERY 4 HOURS PRN
Status: DISCONTINUED | OUTPATIENT
Start: 2021-05-02 | End: 2021-05-04 | Stop reason: HOSPADM

## 2021-05-02 RX ORDER — METHYLERGONOVINE MALEATE 0.2 MG/ML
INJECTION INTRAVENOUS
Status: COMPLETED
Start: 2021-05-02 | End: 2021-05-02

## 2021-05-02 RX ORDER — EPHEDRINE SULFATE 50 MG/ML
5 INJECTION, SOLUTION INTRAVENOUS AS NEEDED
Status: DISCONTINUED | OUTPATIENT
Start: 2021-05-02 | End: 2021-05-02 | Stop reason: HOSPADM

## 2021-05-02 RX ORDER — OXYTOCIN-SODIUM CHLORIDE 0.9% IV SOLN 30 UNIT/500ML 30-0.9/5 UT/ML-%
999 SOLUTION INTRAVENOUS ONCE
Status: COMPLETED | OUTPATIENT
Start: 2021-05-02 | End: 2021-05-02

## 2021-05-02 RX ORDER — TRANEXAMIC ACID 100 MG/ML
1000 INJECTION, SOLUTION INTRAVENOUS ONCE
Status: COMPLETED | OUTPATIENT
Start: 2021-05-02 | End: 2021-05-02

## 2021-05-02 RX ORDER — ONDANSETRON 2 MG/ML
4 INJECTION INTRAMUSCULAR; INTRAVENOUS ONCE AS NEEDED
Status: DISCONTINUED | OUTPATIENT
Start: 2021-05-02 | End: 2021-05-02 | Stop reason: HOSPADM

## 2021-05-02 RX ORDER — LANOLIN
CREAM (ML) TOPICAL
Status: DISCONTINUED | OUTPATIENT
Start: 2021-05-02 | End: 2021-05-04 | Stop reason: HOSPADM

## 2021-05-02 RX ORDER — FAMOTIDINE 10 MG/ML
20 INJECTION, SOLUTION INTRAVENOUS ONCE AS NEEDED
Status: DISCONTINUED | OUTPATIENT
Start: 2021-05-02 | End: 2021-05-02 | Stop reason: HOSPADM

## 2021-05-02 RX ORDER — OXYTOCIN-SODIUM CHLORIDE 0.9% IV SOLN 30 UNIT/500ML 30-0.9/5 UT/ML-%
2-20 SOLUTION INTRAVENOUS
Status: DISCONTINUED | OUTPATIENT
Start: 2021-05-02 | End: 2021-05-02 | Stop reason: HOSPADM

## 2021-05-02 RX ORDER — SODIUM CHLORIDE 0.9 % (FLUSH) 0.9 %
3 SYRINGE (ML) INJECTION EVERY 12 HOURS SCHEDULED
Status: DISCONTINUED | OUTPATIENT
Start: 2021-05-02 | End: 2021-05-02 | Stop reason: HOSPADM

## 2021-05-02 RX ORDER — SODIUM CHLORIDE, SODIUM LACTATE, POTASSIUM CHLORIDE, CALCIUM CHLORIDE 600; 310; 30; 20 MG/100ML; MG/100ML; MG/100ML; MG/100ML
125 INJECTION, SOLUTION INTRAVENOUS CONTINUOUS
Status: DISCONTINUED | OUTPATIENT
Start: 2021-05-02 | End: 2021-05-02

## 2021-05-02 RX ORDER — BISACODYL 10 MG
10 SUPPOSITORY, RECTAL RECTAL DAILY PRN
Status: DISCONTINUED | OUTPATIENT
Start: 2021-05-03 | End: 2021-05-04 | Stop reason: HOSPADM

## 2021-05-02 RX ORDER — METHYLERGONOVINE MALEATE 0.2 MG/ML
200 INJECTION INTRAVENOUS ONCE AS NEEDED
Status: COMPLETED | OUTPATIENT
Start: 2021-05-02 | End: 2021-05-02

## 2021-05-02 RX ORDER — TERBUTALINE SULFATE 1 MG/ML
0.25 INJECTION, SOLUTION SUBCUTANEOUS AS NEEDED
Status: DISCONTINUED | OUTPATIENT
Start: 2021-05-02 | End: 2021-05-02 | Stop reason: HOSPADM

## 2021-05-02 RX ORDER — DOCUSATE SODIUM 100 MG/1
100 CAPSULE, LIQUID FILLED ORAL 2 TIMES DAILY
Status: DISCONTINUED | OUTPATIENT
Start: 2021-05-02 | End: 2021-05-04 | Stop reason: HOSPADM

## 2021-05-02 RX ORDER — TRANEXAMIC ACID 100 MG/ML
INJECTION, SOLUTION INTRAVENOUS
Status: COMPLETED
Start: 2021-05-02 | End: 2021-05-02

## 2021-05-02 RX ORDER — ERYTHROMYCIN 5 MG/G
OINTMENT OPHTHALMIC
Status: DISPENSED
Start: 2021-05-02 | End: 2021-05-03

## 2021-05-02 RX ORDER — PRENATAL VIT/IRON FUM/FOLIC AC 27MG-0.8MG
1 TABLET ORAL DAILY
Status: DISCONTINUED | OUTPATIENT
Start: 2021-05-03 | End: 2021-05-04 | Stop reason: HOSPADM

## 2021-05-02 RX ORDER — ONDANSETRON 4 MG/1
4 TABLET, FILM COATED ORAL EVERY 8 HOURS PRN
Status: DISCONTINUED | OUTPATIENT
Start: 2021-05-02 | End: 2021-05-04 | Stop reason: HOSPADM

## 2021-05-02 RX ORDER — HYDROCORTISONE 25 MG/G
1 CREAM TOPICAL AS NEEDED
Status: DISCONTINUED | OUTPATIENT
Start: 2021-05-02 | End: 2021-05-04 | Stop reason: HOSPADM

## 2021-05-02 RX ORDER — LIDOCAINE HYDROCHLORIDE 10 MG/ML
5 INJECTION, SOLUTION EPIDURAL; INFILTRATION; INTRACAUDAL; PERINEURAL AS NEEDED
Status: DISCONTINUED | OUTPATIENT
Start: 2021-05-02 | End: 2021-05-02 | Stop reason: HOSPADM

## 2021-05-02 RX ORDER — LIDOCAINE HYDROCHLORIDE 15 MG/ML
INJECTION, SOLUTION EPIDURAL; INFILTRATION; INTRACAUDAL; PERINEURAL AS NEEDED
Status: DISCONTINUED | OUTPATIENT
Start: 2021-05-02 | End: 2021-05-02 | Stop reason: SURG

## 2021-05-02 RX ORDER — ONDANSETRON 2 MG/ML
4 INJECTION INTRAMUSCULAR; INTRAVENOUS EVERY 6 HOURS PRN
Status: DISCONTINUED | OUTPATIENT
Start: 2021-05-02 | End: 2021-05-04 | Stop reason: HOSPADM

## 2021-05-02 RX ORDER — SODIUM CHLORIDE 0.9 % (FLUSH) 0.9 %
1-10 SYRINGE (ML) INJECTION AS NEEDED
Status: DISCONTINUED | OUTPATIENT
Start: 2021-05-02 | End: 2021-05-04 | Stop reason: HOSPADM

## 2021-05-02 RX ORDER — PHYTONADIONE 1 MG/.5ML
INJECTION, EMULSION INTRAMUSCULAR; INTRAVENOUS; SUBCUTANEOUS
Status: DISPENSED
Start: 2021-05-02 | End: 2021-05-03

## 2021-05-02 RX ORDER — CARBOPROST TROMETHAMINE 250 UG/ML
250 INJECTION, SOLUTION INTRAMUSCULAR AS NEEDED
Status: DISCONTINUED | OUTPATIENT
Start: 2021-05-02 | End: 2021-05-02 | Stop reason: HOSPADM

## 2021-05-02 RX ORDER — MISOPROSTOL 200 UG/1
800 TABLET ORAL AS NEEDED
Status: DISCONTINUED | OUTPATIENT
Start: 2021-05-02 | End: 2021-05-02 | Stop reason: HOSPADM

## 2021-05-02 RX ADMIN — METHYLERGONOVINE MALEATE 200 MCG: 0.2 INJECTION INTRAVENOUS at 19:47

## 2021-05-02 RX ADMIN — OXYTOCIN 125 ML/HR: 10 INJECTION INTRAVENOUS at 20:06

## 2021-05-02 RX ADMIN — EPHEDRINE SULFATE 5 MG: 50 INJECTION INTRAVENOUS at 13:48

## 2021-05-02 RX ADMIN — OXYTOCIN 2 MILLI-UNITS/MIN: 10 INJECTION INTRAVENOUS at 09:23

## 2021-05-02 RX ADMIN — IBUPROFEN 600 MG: 600 TABLET ORAL at 23:26

## 2021-05-02 RX ADMIN — LIDOCAINE HYDROCHLORIDE AND EPINEPHRINE 3 ML: 15; 5 INJECTION, SOLUTION EPIDURAL at 13:28

## 2021-05-02 RX ADMIN — LIDOCAINE HYDROCHLORIDE AND EPINEPHRINE 2 ML: 15; 5 INJECTION, SOLUTION EPIDURAL at 13:32

## 2021-05-02 RX ADMIN — MISOPROSTOL 800 MCG: 200 TABLET ORAL at 19:47

## 2021-05-02 RX ADMIN — OXYTOCIN 999 ML/HR: 10 INJECTION INTRAVENOUS at 19:31

## 2021-05-02 RX ADMIN — Medication 10 ML/HR: at 13:32

## 2021-05-02 RX ADMIN — TRANEXAMIC ACID 1000 MG: 100 INJECTION, SOLUTION INTRAVENOUS at 19:53

## 2021-05-02 RX ADMIN — SODIUM CHLORIDE, POTASSIUM CHLORIDE, SODIUM LACTATE AND CALCIUM CHLORIDE 125 ML/HR: 600; 310; 30; 20 INJECTION, SOLUTION INTRAVENOUS at 08:54

## 2021-05-02 RX ADMIN — DOCUSATE SODIUM 100 MG: 100 CAPSULE, LIQUID FILLED ORAL at 23:26

## 2021-05-02 RX ADMIN — SODIUM CHLORIDE, POTASSIUM CHLORIDE, SODIUM LACTATE AND CALCIUM CHLORIDE 125 ML/HR: 600; 310; 30; 20 INJECTION, SOLUTION INTRAVENOUS at 16:08

## 2021-05-02 RX ADMIN — SODIUM CHLORIDE, POTASSIUM CHLORIDE, SODIUM LACTATE AND CALCIUM CHLORIDE 1000 ML: 600; 310; 30; 20 INJECTION, SOLUTION INTRAVENOUS at 13:12

## 2021-05-02 RX ADMIN — LIDOCAINE HYDROCHLORIDE 4 ML: 15 INJECTION, SOLUTION EPIDURAL; INFILTRATION; INTRACAUDAL; PERINEURAL at 13:19

## 2021-05-02 NOTE — ANESTHESIA PREPROCEDURE EVALUATION
Anesthesia Evaluation     Patient summary reviewed and Nursing notes reviewed                Airway   Mallampati: II  Dental - normal exam     Pulmonary - negative pulmonary ROS and normal exam   Cardiovascular - negative cardio ROS and normal exam        Neuro/Psych- negative ROS  GI/Hepatic/Renal/Endo    (+)  PUD,      Musculoskeletal (-) negative ROS    Abdominal    Substance History - negative use     OB/GYN    (+) Pregnant,         Other - negative ROS                       Anesthesia Plan    ASA 2     epidural

## 2021-05-02 NOTE — ANESTHESIA PROCEDURE NOTES
Labor Epidural      Patient reassessed immediately prior to procedure    Patient location during procedure: OB  Performed By  Anesthesiologist: Melissa Mclaughlin MD  Preanesthetic Checklist  Completed: patient identified, IV checked, site marked, risks and benefits discussed, surgical consent, monitors and equipment checked, pre-op evaluation and timeout performed  Prep:  Pt Position:sitting  Sterile Tech:cap, gloves, mask and sterile barrier  Prep:chlorhexidine gluconate and isopropyl alcohol  Monitoring:blood pressure monitoring and EKG  Epidural Block Procedure:  Approach:midline  Guidance:landmark technique  Location:L4-L5  Needle Type:Tuohy  Needle Gauge:17  Loss of Resistance Medium: saline  Cath Depth at skin:12 cm  Paresthesia: none  Aspiration:negative  Test Dose:negative  Number of Attempts: 1  Post Assessment:  Dressing:occlusive dressing applied and secured with tape  Pt Tolerance:patient tolerated the procedure well with no apparent complications  Complications:no

## 2021-05-03 LAB
BASOPHILS # BLD AUTO: 0.03 10*3/MM3 (ref 0–0.2)
BASOPHILS NFR BLD AUTO: 0.2 % (ref 0–1.5)
DEPRECATED RDW RBC AUTO: 43.6 FL (ref 37–54)
EOSINOPHIL # BLD AUTO: 0.09 10*3/MM3 (ref 0–0.4)
EOSINOPHIL NFR BLD AUTO: 0.6 % (ref 0.3–6.2)
ERYTHROCYTE [DISTWIDTH] IN BLOOD BY AUTOMATED COUNT: 13.2 % (ref 12.3–15.4)
HCT VFR BLD AUTO: 35.8 % (ref 34–46.6)
HGB BLD-MCNC: 12.1 G/DL (ref 12–15.9)
IMM GRANULOCYTES # BLD AUTO: 0.1 10*3/MM3 (ref 0–0.05)
IMM GRANULOCYTES NFR BLD AUTO: 0.7 % (ref 0–0.5)
LYMPHOCYTES # BLD AUTO: 2.54 10*3/MM3 (ref 0.7–3.1)
LYMPHOCYTES NFR BLD AUTO: 17.8 % (ref 19.6–45.3)
MCH RBC QN AUTO: 30.9 PG (ref 26.6–33)
MCHC RBC AUTO-ENTMCNC: 33.8 G/DL (ref 31.5–35.7)
MCV RBC AUTO: 91.3 FL (ref 79–97)
MONOCYTES # BLD AUTO: 1.09 10*3/MM3 (ref 0.1–0.9)
MONOCYTES NFR BLD AUTO: 7.6 % (ref 5–12)
NEUTROPHILS NFR BLD AUTO: 10.41 10*3/MM3 (ref 1.7–7)
NEUTROPHILS NFR BLD AUTO: 73.1 % (ref 42.7–76)
NRBC BLD AUTO-RTO: 0 /100 WBC (ref 0–0.2)
PLATELET # BLD AUTO: 236 10*3/MM3 (ref 140–450)
PMV BLD AUTO: 10.9 FL (ref 6–12)
RBC # BLD AUTO: 3.92 10*6/MM3 (ref 3.77–5.28)
WBC # BLD AUTO: 14.26 10*3/MM3 (ref 3.4–10.8)

## 2021-05-03 PROCEDURE — 85025 COMPLETE CBC W/AUTO DIFF WBC: CPT | Performed by: OBSTETRICS & GYNECOLOGY

## 2021-05-03 RX ADMIN — Medication 1 TABLET: at 08:48

## 2021-05-03 RX ADMIN — IBUPROFEN 600 MG: 600 TABLET ORAL at 18:52

## 2021-05-03 RX ADMIN — Medication: at 02:24

## 2021-05-03 RX ADMIN — DOCUSATE SODIUM 100 MG: 100 CAPSULE, LIQUID FILLED ORAL at 22:25

## 2021-05-03 RX ADMIN — DOCUSATE SODIUM 100 MG: 100 CAPSULE, LIQUID FILLED ORAL at 08:47

## 2021-05-03 RX ADMIN — IBUPROFEN 600 MG: 600 TABLET ORAL at 08:48

## 2021-05-03 NOTE — ANESTHESIA POSTPROCEDURE EVALUATION
Patient: Chloe Ugalde    Procedure Summary     Date: 05/02/21 Room / Location:     Anesthesia Start: 1314 Anesthesia Stop: 1929    Procedure: LABOR ANALGESIA Diagnosis:     Scheduled Providers:  Provider: Melissa Mclaughlin MD    Anesthesia Type: epidural ASA Status: 2          Anesthesia Type: epidural    Vitals  Vitals Value Taken Time   /78 05/02/21 2131   Temp 36.9 °C (98.5 °F) 05/02/21 2000   Pulse 105 05/02/21 2131   Resp 16 05/02/21 2131   SpO2 96 % 05/02/21 2131   Vitals shown include unvalidated device data.        Post Anesthesia Care and Evaluation    Anesthetic complications: No anesthetic complications

## 2021-05-04 VITALS
HEART RATE: 96 BPM | WEIGHT: 215.8 LBS | OXYGEN SATURATION: 97 % | BODY MASS INDEX: 33.87 KG/M2 | SYSTOLIC BLOOD PRESSURE: 109 MMHG | HEIGHT: 67 IN | DIASTOLIC BLOOD PRESSURE: 77 MMHG | TEMPERATURE: 97.6 F | RESPIRATION RATE: 16 BRPM

## 2021-05-04 RX ORDER — IBUPROFEN 600 MG/1
600 TABLET ORAL EVERY 6 HOURS PRN
Qty: 60 TABLET | Refills: 0 | Status: SHIPPED | OUTPATIENT
Start: 2021-05-04 | End: 2022-01-14

## 2021-05-04 RX ADMIN — DOCUSATE SODIUM 100 MG: 100 CAPSULE, LIQUID FILLED ORAL at 09:20

## 2021-05-04 RX ADMIN — IBUPROFEN 600 MG: 600 TABLET ORAL at 03:03

## 2021-05-04 RX ADMIN — Medication 1 TABLET: at 09:20

## 2021-07-07 DIAGNOSIS — L74.510 HYPERHIDROSIS OF AXILLA: ICD-10-CM

## 2021-07-08 RX ORDER — ALUMINUM CHLORIDE 20 %
SOLUTION, NON-ORAL TOPICAL
Qty: 60 ML | Refills: 5 | OUTPATIENT
Start: 2021-07-08

## 2022-01-14 ENCOUNTER — OFFICE VISIT (OUTPATIENT)
Dept: FAMILY MEDICINE CLINIC | Age: 27
End: 2022-01-14

## 2022-01-14 VITALS
SYSTOLIC BLOOD PRESSURE: 127 MMHG | WEIGHT: 200.4 LBS | DIASTOLIC BLOOD PRESSURE: 86 MMHG | TEMPERATURE: 97.9 F | HEART RATE: 93 BPM | BODY MASS INDEX: 31.45 KG/M2 | HEIGHT: 67 IN

## 2022-01-14 DIAGNOSIS — R61 NIGHT SWEATS: Primary | ICD-10-CM

## 2022-01-14 DIAGNOSIS — J01.10 ACUTE NON-RECURRENT FRONTAL SINUSITIS: ICD-10-CM

## 2022-01-14 PROBLEM — O47.9 IRREGULAR CONTRACTIONS: Status: RESOLVED | Noted: 2021-03-01 | Resolved: 2022-01-14

## 2022-01-14 PROBLEM — Z34.90 PREGNANCY: Status: RESOLVED | Noted: 2021-05-02 | Resolved: 2022-01-14

## 2022-01-14 PROCEDURE — 99202 OFFICE O/P NEW SF 15 MIN: CPT | Performed by: NURSE PRACTITIONER

## 2022-01-14 RX ORDER — NORGESTREL-ETHINYL ESTRADIOL 0.3-0.03MG
1 TABLET ORAL DAILY
COMMUNITY
Start: 2021-11-24

## 2022-01-14 RX ORDER — AMOXICILLIN AND CLAVULANATE POTASSIUM 875; 125 MG/1; MG/1
1 TABLET, FILM COATED ORAL 2 TIMES DAILY
Qty: 14 TABLET | Refills: 0 | Status: SHIPPED | OUTPATIENT
Start: 2022-01-14 | End: 2022-02-11

## 2022-01-14 NOTE — PROGRESS NOTES
"Chief Complaint  Establish Care (blood sugar issues)    Subjective  Patient is a 26-year-old female here today for her first visit at the office.  Concern is night sweats and feeling more hot in general since her baby was born in May.  Last menstrual cycle was within the last couple of weeks.  She is not breast-feeding.  Denies anxiety or fatigue.  Also with concerns that maybe her blood sugar gets too low at times especially midmorning.  These episodes do not always coincide with increased heat intolerance.  Does wonder if it could be her current birth control pill that is causing increased sweating.    Also with 2 weeks of nasal congestion and yellow nasal discharge.  No concerns for having COVID infection.  Has used Flonase with some relief.  Does consistently produce some yellow mucus from her nose.  She has been afebrile and denies body aches, shortness of breath, or wheezing.        Chloe Ugalde presents to [facility]    Review of Systems   Constitutional: Negative.    HENT: Positive for congestion and sinus pressure.    Respiratory: Negative.    Cardiovascular: Negative.    Endocrine: Positive for heat intolerance.   Musculoskeletal: Negative.    Neurological: Negative.    Psychiatric/Behavioral: Negative.         Objective   Vital Signs:   Vitals:    01/14/22 1512   BP: 127/86   BP Location: Left arm   Patient Position: Sitting   Pulse: 93   Temp: 97.9 °F (36.6 °C)   TempSrc: Oral   Weight: 90.9 kg (200 lb 6.4 oz)   Height: 170.2 cm (67\")      Physical Exam  Vitals reviewed.   Constitutional:       General: She is not in acute distress.     Appearance: Normal appearance. She is well-developed.   Neck:      Thyroid: No thyroid mass, thyromegaly or thyroid tenderness.   Cardiovascular:      Rate and Rhythm: Normal rate and regular rhythm.      Heart sounds: Normal heart sounds.   Pulmonary:      Effort: Pulmonary effort is normal.      Breath sounds: Normal breath sounds. "   Musculoskeletal:      Right lower leg: No edema.      Left lower leg: No edema.   Skin:     General: Skin is warm and dry.   Neurological:      General: No focal deficit present.      Mental Status: She is alert.   Psychiatric:         Attention and Perception: Attention normal.         Mood and Affect: Mood and affect normal.         Behavior: Behavior normal.          Result Review :                Assessment and Plan    Diagnoses and all orders for this visit:    1. Night sweats (Primary)  Assessment & Plan:  She will return for fasting lab work in order to obtain a fasting blood sugar.  Further treatment pending lab results.  If labs are normal it is reasonable to ask her gynecologist if this symptom could be due to her current birth control pill medication.    Orders:  -     TSH  -     Comprehensive metabolic panel; Future  -     CBC w AUTO Differential; Future    2. Acute non-recurrent frontal sinusitis  Assessment & Plan:  Treat with Augmentin.  Continue antihistamine and Flonase or Nasacort nose spray.  Follow-up if not improving.  Denies any concerns for having COVID infection.  Symptoms been present for 2 weeks or more.    Orders:  -     amoxicillin-clavulanate (Augmentin) 875-125 MG per tablet; Take 1 tablet by mouth 2 (Two) Times a Day.  Dispense: 14 tablet; Refill: 0      Follow Up    No follow-ups on file.  Patient was given instructions and counseling regarding her condition or for health maintenance advice. Please see specific information pulled into the AVS if appropriate.

## 2022-01-14 NOTE — ASSESSMENT & PLAN NOTE
She will return for fasting lab work in order to obtain a fasting blood sugar.  Further treatment pending lab results.  If labs are normal it is reasonable to ask her gynecologist if this symptom could be due to her current birth control pill medication.

## 2022-01-14 NOTE — ASSESSMENT & PLAN NOTE
Treat with Augmentin.  Continue antihistamine and Flonase or Nasacort nose spray.  Follow-up if not improving.  Denies any concerns for having COVID infection.  Symptoms been present for 2 weeks or more.

## 2022-01-19 ENCOUNTER — LAB (OUTPATIENT)
Dept: LAB | Facility: HOSPITAL | Age: 27
End: 2022-01-19

## 2022-01-19 DIAGNOSIS — R61 NIGHT SWEATS: ICD-10-CM

## 2022-01-19 DIAGNOSIS — R94.5 ABNORMAL LIVER FUNCTION: Primary | ICD-10-CM

## 2022-01-19 LAB
ALBUMIN SERPL-MCNC: 3.9 G/DL (ref 3.5–5.2)
ALBUMIN/GLOB SERPL: 1.3 G/DL
ALP SERPL-CCNC: 76 U/L (ref 39–117)
ALT SERPL W P-5'-P-CCNC: 111 U/L (ref 1–33)
ANION GAP SERPL CALCULATED.3IONS-SCNC: 8.6 MMOL/L (ref 5–15)
AST SERPL-CCNC: 82 U/L (ref 1–32)
BASOPHILS # BLD AUTO: 0.02 10*3/MM3 (ref 0–0.2)
BASOPHILS NFR BLD AUTO: 0.4 % (ref 0–1.5)
BILIRUB SERPL-MCNC: 0.3 MG/DL (ref 0–1.2)
BUN SERPL-MCNC: 8 MG/DL (ref 6–20)
BUN/CREAT SERPL: 12.9 (ref 7–25)
CALCIUM SPEC-SCNC: 9.2 MG/DL (ref 8.6–10.5)
CHLORIDE SERPL-SCNC: 105 MMOL/L (ref 98–107)
CO2 SERPL-SCNC: 24.4 MMOL/L (ref 22–29)
CREAT SERPL-MCNC: 0.62 MG/DL (ref 0.57–1)
DEPRECATED RDW RBC AUTO: 40.5 FL (ref 37–54)
EOSINOPHIL # BLD AUTO: 0.08 10*3/MM3 (ref 0–0.4)
EOSINOPHIL NFR BLD AUTO: 1.7 % (ref 0.3–6.2)
ERYTHROCYTE [DISTWIDTH] IN BLOOD BY AUTOMATED COUNT: 13.1 % (ref 12.3–15.4)
GFR SERPL CREATININE-BSD FRML MDRD: 116 ML/MIN/1.73
GFR SERPL CREATININE-BSD FRML MDRD: 141 ML/MIN/1.73
GLOBULIN UR ELPH-MCNC: 3.1 GM/DL
GLUCOSE SERPL-MCNC: 92 MG/DL (ref 65–99)
HCT VFR BLD AUTO: 40.9 % (ref 34–46.6)
HGB BLD-MCNC: 13.9 G/DL (ref 12–15.9)
IMM GRANULOCYTES # BLD AUTO: 0.01 10*3/MM3 (ref 0–0.05)
IMM GRANULOCYTES NFR BLD AUTO: 0.2 % (ref 0–0.5)
LYMPHOCYTES # BLD AUTO: 2.41 10*3/MM3 (ref 0.7–3.1)
LYMPHOCYTES NFR BLD AUTO: 51.1 % (ref 19.6–45.3)
MCH RBC QN AUTO: 28.4 PG (ref 26.6–33)
MCHC RBC AUTO-ENTMCNC: 34 G/DL (ref 31.5–35.7)
MCV RBC AUTO: 83.6 FL (ref 79–97)
MONOCYTES # BLD AUTO: 0.57 10*3/MM3 (ref 0.1–0.9)
MONOCYTES NFR BLD AUTO: 12.1 % (ref 5–12)
NEUTROPHILS NFR BLD AUTO: 1.63 10*3/MM3 (ref 1.7–7)
NEUTROPHILS NFR BLD AUTO: 34.5 % (ref 42.7–76)
PLATELET # BLD AUTO: 340 10*3/MM3 (ref 140–450)
PMV BLD AUTO: 11.1 FL (ref 6–12)
POTASSIUM SERPL-SCNC: 4.4 MMOL/L (ref 3.5–5.2)
PROT SERPL-MCNC: 7 G/DL (ref 6–8.5)
RBC # BLD AUTO: 4.89 10*6/MM3 (ref 3.77–5.28)
SODIUM SERPL-SCNC: 138 MMOL/L (ref 136–145)
TSH SERPL DL<=0.05 MIU/L-ACNC: <0.005 UIU/ML (ref 0.27–4.2)
WBC NRBC COR # BLD: 4.72 10*3/MM3 (ref 3.4–10.8)

## 2022-01-19 PROCEDURE — 36415 COLL VENOUS BLD VENIPUNCTURE: CPT

## 2022-01-19 PROCEDURE — 80053 COMPREHEN METABOLIC PANEL: CPT

## 2022-01-19 PROCEDURE — 85025 COMPLETE CBC W/AUTO DIFF WBC: CPT

## 2022-01-19 PROCEDURE — 84443 ASSAY THYROID STIM HORMONE: CPT | Performed by: NURSE PRACTITIONER

## 2022-01-20 ENCOUNTER — TELEPHONE (OUTPATIENT)
Dept: FAMILY MEDICINE CLINIC | Age: 27
End: 2022-01-20

## 2022-01-20 NOTE — PROGRESS NOTES
Your thyroid gland is overactive which can be cause of your increased sweating .  I recommend that we refer you to endocrinology for further evaluation.  [facility] [place]?

## 2022-01-20 NOTE — PROGRESS NOTES
Normal blood sugar and kidney function.  Liver enzymes (AST, ALT) are elevated.  This could be due to recent illness, regular use of ibuprofen or aleve, high fat diet.  Are you having any upper abdominal pain or stomach discomfort?  I recommend an ultrasound of your liver.

## 2022-01-20 NOTE — TELEPHONE ENCOUNTER
Caller: Chloe Ugalde    Relationship: Self    Best call back number: [phone]    What is the best time to reach you: ANYTIME, CAN LEAVE A MESSAGE     Who are you requesting to speak with (clinical staff, provider,  specific staff member): JAIME MCCOY     What was the call regarding: PATIENT IS CALLING REGARDING RESULTS FROM HER BLOOD WORK.  REGARDING THYROID PATIENT STATED THAT SHE WOULD RATHER GO TO [place].      REGARDING THE ULTRASOUND OF THE LIVER, PATIENT WASN'T SURE IF THAT WOULD BE SOMETHING SHE COULD DO THERE IN THE OFFICE OR IF YOU ALL WOULD REFER HER OUT TO SOME PLACE TO HAVE THAT DONE.  ALSO WANTED TO SEE IF FATTY LIVER COULD BE HEREDITARY.  HER FATHER AND GRANDFATHER BOTH HAD FATTY LIVER SO SHE JUST WANTED TO CHECK AND SEE IF THIS COULD BE SOME OF HER LIVER ISSUES.     Do you require a callback: YES

## 2022-01-21 DIAGNOSIS — E05.90 HYPERTHYROIDISM: Primary | ICD-10-CM

## 2022-01-21 NOTE — TELEPHONE ENCOUNTER
I have referred you to allie medrano, , endocrinology, in [place].  You may see her or one of her associates.  You will get a call with further information.  Liver US can be done at diagnostic center here in the building.  Heredity could have a link to fatty liver disease.  US will confirm if that is the case and would allow you to adjust some lifestyle changes.  Report any abdominal pain or stomach related symptoms if they were to occur.

## 2022-02-07 NOTE — PROGRESS NOTES
Chief Complaint  Hyperthyroidism    Subjective          Chloe Byrnes presents to [facility]  History of Present Illness    26-year-old  female presents for evaluation and treatment recommendations for hyperthyroidism.    She has been in her usual state of health until last year. She is  and delivered a healthy baby boy on May 1, 2021.  She is currently on birth control. She is not nursing her child.    Soon after delivery she started experiencing nonspecific symptoms.  In 2021 she had an episode of Covid.  In mid  her nonspecific symptoms had become worse.    She mainly experienced heat intolerance and tremors. She denies fatigue, anxiety, polyphagia, unexplained weight loss, muscle weakness.  She has had weight fluctuation since last year. Her prepregnancy weight was 175 lb. After delivery she weighed 200 lb. At home she now weighs 191 lb.   She has been dieting and lost about 12 lbs.     There is no family history of thyroid dysfunction.    Patient denies blurred vision, double vision, pain or swelling of the eyes.    Patient denies tobacco use    Patient denies dysphagia, odynophagia, dry cough, hoarseness of voice     There is no history of ionizing radiation to the head or neck.  There is no family history of thyroid cancer.    Patient is not on biotin, over the counter thyroid medications, Li, Amiodarone   She is on MV gummies, but started these after her blood draw  No history of recent IV contrast dye      PMH, Allergies and medication list reviewed.  -Note has been made of abnormal liver function studies. She is scheduled for a liver ultrasound on 2022.  She has not met with a gastroenterologist yet. Evaluation is in process.  FH- father has HTN and mother is healthy  Sister has T2 DM  SH- T/E/D,  with one child. She is a  at dental office        Most recent thyroid function studies:  Results for CHLOE BYRNES  "(MRN [id number]) as of 2/7/2022 11:09   Ref. Range 1/19/2022 07:33   TSH Baseline Latest Ref Range: 0.270 - 4.200 uIU/mL <0.005 (L)     Results for RONI BYRNES (MRN [id number]) as of 2/7/2022 11:09   Ref. Range 1/19/2022 07:33   ALT (SGPT) Latest Ref Range: 1 - 33 U/L 111 (H)   AST (SGOT) Latest Ref Range: 1 - 32 U/L 82 (H)       Thyroid imaging studies: None available.        Objective   Vital Signs:   /85   Pulse 81   Resp 20   Ht 170.2 cm (67\")   Wt 89.3 kg (196 lb 12.8 oz)   SpO2 95%   BMI 30.82 kg/m²     Physical Exam  Vitals and nursing note reviewed.   Constitutional:       General: She is not in acute distress.     Appearance: She is not ill-appearing, toxic-appearing or diaphoretic.   HENT:      Head: Normocephalic and atraumatic.      Nose: Nose normal.      Mouth/Throat:      Mouth: Mucous membranes are moist.      Pharynx: Oropharynx is clear. No oropharyngeal exudate or posterior oropharyngeal erythema.   Eyes:      General: No scleral icterus.     Extraocular Movements: Extraocular movements intact.      Conjunctiva/sclera: Conjunctivae normal.      Pupils: Pupils are equal, round, and reactive to light.      Comments: There is no lid lag or lid retraction. There is no exophthalmos noted. There is no redness or swelling of the conjunctiva. There is no redness or swelling around the eyes.   Neck:      Thyroid: No thyroid mass, thyromegaly or thyroid tenderness.      Vascular: No carotid bruit.      Trachea: Trachea normal.   Cardiovascular:      Rate and Rhythm: Normal rate and regular rhythm.      Pulses: Normal pulses.      Heart sounds: Normal heart sounds. No murmur heard.  No friction rub. No gallop.    Pulmonary:      Effort: Pulmonary effort is normal. No respiratory distress.      Breath sounds: Normal breath sounds. No stridor. No wheezing, rhonchi or rales.   Chest:      Chest wall: No tenderness.   Abdominal:      General: Bowel sounds are normal. There is no distension.     "  Palpations: Abdomen is soft. There is no mass.      Tenderness: There is no abdominal tenderness. There is no rebound.   Musculoskeletal:         General: No swelling, tenderness, deformity or signs of injury. Normal range of motion.      Cervical back: Normal range of motion and neck supple. No rigidity or tenderness.      Right lower leg: No edema.      Left lower leg: No edema.   Lymphadenopathy:      Cervical: No cervical adenopathy.   Skin:     General: Skin is warm and dry.      Capillary Refill: Capillary refill takes 2 to 3 seconds.      Coloration: Skin is not jaundiced or pale.      Findings: No bruising, erythema, lesion or rash.   Neurological:      General: No focal deficit present.      Mental Status: She is alert and oriented to person, place, and time. Mental status is at baseline.      Cranial Nerves: No cranial nerve deficit.      Sensory: No sensory deficit.      Motor: No weakness.      Coordination: Coordination normal.      Gait: Gait normal.      Deep Tendon Reflexes: Reflexes normal.      Comments: Minimal fine tremor on outstretched hands, right more than left.   Psychiatric:         Mood and Affect: Mood normal.         Behavior: Behavior normal.         Thought Content: Thought content normal.         Judgment: Judgment normal.        Result Review :     TSH    TSH 1/19/22   TSH <0.005 (A)   (A) Abnormal value                      Assessment and Plan    Diagnoses and all orders for this visit:    1. Hyperthyroidism (Primary)  -     TSH  -     T4, Free  -     T3, Free  -     Thyroid Peroxidase Antibody  -     Thyroid Stimulating Immunoglobulin  -     Thyroglobulin Antibody  -     NM Thyroid Uptake & Scan  -     US Thyroid  -     Comprehensive Metabolic Panel  -     CBC Auto Differential      The patient has minimal symptoms of hyperthyroidism.  Her labs show a suppressed TSH.    We discussed the differential diagnosis, including postpartum thyroiditis, post viral etiology and Graves'  disease.  We will have the patient do her blood work and imaging studies as above.  We will not start her on beta-blockers or methimazole treatment.  We will follow-up on the evaluation of the abnormal liver function studies.  We will have her return to clinic in 1 month with all the above investigations completed.    RTC 1 month    Follow Up   Return in about 4 weeks (around 3/11/2022).  Patient was given instructions and counseling regarding her condition or for health maintenance advice. Please see specific information pulled into the AVS if appropriate.

## 2022-02-09 ENCOUNTER — APPOINTMENT (OUTPATIENT)
Dept: ULTRASOUND IMAGING | Facility: HOSPITAL | Age: 27
End: 2022-02-09

## 2022-02-11 ENCOUNTER — OFFICE VISIT (OUTPATIENT)
Dept: ENDOCRINOLOGY | Age: 27
End: 2022-02-11

## 2022-02-11 VITALS
HEIGHT: 67 IN | WEIGHT: 196.8 LBS | RESPIRATION RATE: 20 BRPM | DIASTOLIC BLOOD PRESSURE: 85 MMHG | SYSTOLIC BLOOD PRESSURE: 126 MMHG | BODY MASS INDEX: 30.89 KG/M2 | HEART RATE: 81 BPM | OXYGEN SATURATION: 95 %

## 2022-02-11 DIAGNOSIS — E05.90 HYPERTHYROIDISM: Primary | ICD-10-CM

## 2022-02-11 PROCEDURE — 99204 OFFICE O/P NEW MOD 45 MIN: CPT | Performed by: INTERNAL MEDICINE

## 2022-02-13 LAB
ALBUMIN SERPL-MCNC: 4.2 G/DL (ref 3.9–5)
ALBUMIN/GLOB SERPL: 1.4 {RATIO} (ref 1.2–2.2)
ALP SERPL-CCNC: 77 IU/L (ref 44–121)
ALT SERPL-CCNC: 23 IU/L (ref 0–32)
AST SERPL-CCNC: 27 IU/L (ref 0–40)
BASOPHILS # BLD AUTO: 0 X10E3/UL (ref 0–0.2)
BASOPHILS NFR BLD AUTO: 1 %
BILIRUB SERPL-MCNC: 0.3 MG/DL (ref 0–1.2)
BUN SERPL-MCNC: 7 MG/DL (ref 6–20)
BUN/CREAT SERPL: 9 (ref 9–23)
CALCIUM SERPL-MCNC: 9.6 MG/DL (ref 8.7–10.2)
CHLORIDE SERPL-SCNC: 106 MMOL/L (ref 96–106)
CO2 SERPL-SCNC: 21 MMOL/L (ref 20–29)
CREAT SERPL-MCNC: 0.74 MG/DL (ref 0.57–1)
EOSINOPHIL # BLD AUTO: 0.1 X10E3/UL (ref 0–0.4)
EOSINOPHIL NFR BLD AUTO: 1 %
ERYTHROCYTE [DISTWIDTH] IN BLOOD BY AUTOMATED COUNT: 13.4 % (ref 11.7–15.4)
GLOBULIN SER CALC-MCNC: 3 G/DL (ref 1.5–4.5)
GLUCOSE SERPL-MCNC: 85 MG/DL (ref 65–99)
HCT VFR BLD AUTO: 43.1 % (ref 34–46.6)
HGB BLD-MCNC: 14.6 G/DL (ref 11.1–15.9)
IMM GRANULOCYTES # BLD AUTO: 0 X10E3/UL (ref 0–0.1)
IMM GRANULOCYTES NFR BLD AUTO: 0 %
LYMPHOCYTES # BLD AUTO: 1.9 X10E3/UL (ref 0.7–3.1)
LYMPHOCYTES NFR BLD AUTO: 32 %
MCH RBC QN AUTO: 28.5 PG (ref 26.6–33)
MCHC RBC AUTO-ENTMCNC: 33.9 G/DL (ref 31.5–35.7)
MCV RBC AUTO: 84 FL (ref 79–97)
MONOCYTES # BLD AUTO: 0.6 X10E3/UL (ref 0.1–0.9)
MONOCYTES NFR BLD AUTO: 10 %
NEUTROPHILS # BLD AUTO: 3.2 X10E3/UL (ref 1.4–7)
NEUTROPHILS NFR BLD AUTO: 56 %
PLATELET # BLD AUTO: 261 X10E3/UL (ref 150–450)
POTASSIUM SERPL-SCNC: 5 MMOL/L (ref 3.5–5.2)
PROT SERPL-MCNC: 7.2 G/DL (ref 6–8.5)
RBC # BLD AUTO: 5.13 X10E6/UL (ref 3.77–5.28)
SODIUM SERPL-SCNC: 140 MMOL/L (ref 134–144)
T3FREE SERPL-MCNC: 4 PG/ML (ref 2–4.4)
T4 FREE SERPL-MCNC: 1.13 NG/DL (ref 0.82–1.77)
THYROPEROXIDASE AB SERPL-ACNC: <8 IU/ML (ref 0–34)
TSH SERPL DL<=0.005 MIU/L-ACNC: 0.03 UIU/ML (ref 0.45–4.5)
TSI SER-ACNC: <0.1 IU/L (ref 0–0.55)
WBC # BLD AUTO: 5.8 X10E3/UL (ref 3.4–10.8)

## 2022-02-16 ENCOUNTER — TELEMEDICINE (OUTPATIENT)
Dept: FAMILY MEDICINE CLINIC | Facility: TELEHEALTH | Age: 27
End: 2022-02-16

## 2022-02-16 DIAGNOSIS — J01.00 ACUTE NON-RECURRENT MAXILLARY SINUSITIS: Primary | ICD-10-CM

## 2022-02-16 PROCEDURE — 99213 OFFICE O/P EST LOW 20 MIN: CPT | Performed by: NURSE PRACTITIONER

## 2022-02-16 RX ORDER — DOXYCYCLINE HYCLATE 100 MG/1
100 CAPSULE ORAL 2 TIMES DAILY
Qty: 14 CAPSULE | Refills: 0 | Status: SHIPPED | OUTPATIENT
Start: 2022-02-16 | End: 2022-02-23

## 2022-02-16 RX ORDER — PREDNISONE 10 MG/1
TABLET ORAL DAILY
Qty: 21 EACH | Refills: 0 | Status: SHIPPED | OUTPATIENT
Start: 2022-02-16 | End: 2022-02-22

## 2022-02-16 NOTE — PROGRESS NOTES
Subjective   Chief Complaint   Patient presents with   • Sinusitis       Chloe Ugalde is a 26 y.o. female.     Pt reports being treated for a sinus infection 1 month ago with Augmentin. She finished the antibiotic but has had continued congestion and is starting to blow out green nasal drainage again. She also reports upper dental pain.     Sinusitis  This is a new problem. Episode onset: 1 month. The problem is unchanged. There has been no fever. Associated symptoms include congestion and sinus pressure. Pertinent negatives include no chills, coughing, diaphoresis, sneezing or sore throat. Treatments tried: augmentin.        Allergies   Allergen Reactions   • Latex Hives     Blister per patient        Past Medical History:   Diagnosis Date   • Duodenal ulcer    • Migraines    • Sepsis (HCC)    • VRE (vancomycin resistant enterococcus) culture positive 08/2013       Past Surgical History:   Procedure Laterality Date   • CHOLECYSTECTOMY  2013   • EXPLORATORY LAPAROTOMY     • REVISION OF ABDOMINAL SCAR  2013   • WISDOM TOOTH EXTRACTION         Social History     Socioeconomic History   • Marital status:    Tobacco Use   • Smoking status: Never Smoker   • Smokeless tobacco: Never Used   Vaping Use   • Vaping Use: Never used   Substance and Sexual Activity   • Alcohol use: Yes     Comment: occasional prior to pregnancy   • Drug use: No   • Sexual activity: Yes       Family History   Problem Relation Age of Onset   • Hypertension Father    • Diabetes Maternal Grandmother    • Diabetes Paternal Grandmother    • Diabetes Sister          Current Outpatient Medications:   •  Cryselle-28 0.3-30 MG-MCG per tablet, Take 1 tablet by mouth Daily., Disp: , Rfl:   •  doxycycline (VIBRAMYCIN) 100 MG capsule, Take 1 capsule by mouth 2 (Two) Times a Day for 7 days., Disp: 14 capsule, Rfl: 0  •  predniSONE (DELTASONE) 10 MG (21) dose pack, Take  by mouth Daily for 6 days., Disp: 21 each, Rfl: 0      Review of Systems    Constitutional: Negative for chills, diaphoresis, fatigue and fever.   HENT: Positive for congestion and sinus pressure. Negative for sneezing and sore throat.    Respiratory: Negative for cough, chest tightness and wheezing.    Gastrointestinal: Negative.    Musculoskeletal: Negative for myalgias.        There were no vitals filed for this visit.    Objective   Physical Exam  Constitutional:       General: She is not in acute distress.     Appearance: Normal appearance. She is not ill-appearing, toxic-appearing or diaphoretic.   HENT:      Head: Normocephalic.      Nose: Congestion present.      Right Sinus: Maxillary sinus tenderness present. No frontal sinus tenderness.      Left Sinus: Maxillary sinus tenderness present. No frontal sinus tenderness.      Comments: Maxillary tenderness is worse on the right side     Mouth/Throat:      Lips: Pink.      Mouth: Mucous membranes are moist.   Pulmonary:      Effort: Pulmonary effort is normal.   Neurological:      Mental Status: She is alert and oriented to person, place, and time.   Psychiatric:         Mood and Affect: Mood normal.         Behavior: Behavior normal.          Procedures     Assessment/Plan   Diagnoses and all orders for this visit:    1. Acute non-recurrent maxillary sinusitis (Primary)  -     doxycycline (VIBRAMYCIN) 100 MG capsule; Take 1 capsule by mouth 2 (Two) Times a Day for 7 days.  Dispense: 14 capsule; Refill: 0  -     predniSONE (DELTASONE) 10 MG (21) dose pack; Take  by mouth Daily for 6 days.  Dispense: 21 each; Refill: 0      You may also use over the counter Flonase for congestion.   Alternate tylenol and motrin for pain and/or fever, stay hydrated and rest.   If symptoms worsen or do not improve follow up with your PCP or visit your nearest Urgent Care Center or ER.          PLAN: Discussed dosing, side effects, recommended other symptomatic care.  Patient should follow up with primary care provider if symptoms worsen, fail to  resolve or other symptoms need attention. Patient/family agree to the above.         GENET Guajardo     This visit was performed via Telehealth.  This patient has been instructed to follow-up with their primary care provider if their symptoms worsen or the treatment provided does not resolve their illness.

## 2022-02-16 NOTE — PATIENT INSTRUCTIONS
You may also use over the counter Flonase for congestion.   Alternate tylenol and motrin for pain and/or fever, stay hydrated and rest.   If symptoms worsen or do not improve follow up with your PCP or visit your nearest Urgent Care Center or ER.        Sinusitis, Adult  Sinusitis is soreness and swelling (inflammation) of your sinuses. Sinuses are hollow spaces in the bones around your face. They are located:  · Around your eyes.  · In the middle of your forehead.  · Behind your nose.  · In your cheekbones.  Your sinuses and nasal passages are lined with a fluid called mucus. Mucus drains out of your sinuses. Swelling can trap mucus in your sinuses. This lets germs (bacteria, virus, or fungus) grow, which leads to infection. Most of the time, this condition is caused by a virus.  What are the causes?  This condition is caused by:  · Allergies.  · Asthma.  · Germs.  · Things that block your nose or sinuses.  · Growths in the nose (nasal polyps).  · Chemicals or irritants in the air.  · Fungus (rare).  What increases the risk?  You are more likely to develop this condition if:  · You have a weak body defense system (immune system).  · You do a lot of swimming or diving.  · You use nasal sprays too much.  · You smoke.  What are the signs or symptoms?  The main symptoms of this condition are pain and a feeling of pressure around the sinuses. Other symptoms include:  · Stuffy nose (congestion).  · Runny nose (drainage).  · Swelling and warmth in the sinuses.  · Headache.  · Toothache.  · A cough that may get worse at night.  · Mucus that collects in the throat or the back of the nose (postnasal drip).  · Being unable to smell and taste.  · Being very tired (fatigue).  · A fever.  · Sore throat.  · Bad breath.  How is this diagnosed?  This condition is diagnosed based on:  · Your symptoms.  · Your medical history.  · A physical exam.  · Tests to find out if your condition is short-term (acute) or long-term (chronic).  Your doctor may:  ? Check your nose for growths (polyps).  ? Check your sinuses using a tool that has a light (endoscope).  ? Check for allergies or germs.  ? Do imaging tests, such as an MRI or CT scan.  How is this treated?  Treatment for this condition depends on the cause and whether it is short-term or long-term.  · If caused by a virus, your symptoms should go away on their own within 10 days. You may be given medicines to relieve symptoms. They include:  ? Medicines that shrink swollen tissue in the nose.  ? Medicines that treat allergies (antihistamines).  ? A spray that treats swelling of the nostrils.   ? Rinses that help get rid of thick mucus in your nose (nasal saline washes).  · If caused by bacteria, your doctor may wait to see if you will get better without treatment. You may be given antibiotic medicine if you have:  ? A very bad infection.  ? A weak body defense system.  · If caused by growths in the nose, you may need to have surgery.  Follow these instructions at home:  Medicines  · Take, use, or apply over-the-counter and prescription medicines only as told by your doctor. These may include nasal sprays.  · If you were prescribed an antibiotic medicine, take it as told by your doctor. Do not stop taking the antibiotic even if you start to feel better.  Hydrate and humidify    · Drink enough water to keep your pee (urine) pale yellow.  · Use a cool mist humidifier to keep the humidity level in your home above 50%.  · Breathe in steam for 10-15 minutes, 3-4 times a day, or as told by your doctor. You can do this in the bathroom while a hot shower is running.  · Try not to spend time in cool or dry air.    Rest  · Rest as much as you can.  · Sleep with your head raised (elevated).  · Make sure you get enough sleep each night.  General instructions    · Put a warm, moist washcloth on your face 3-4 times a day, or as often as told by your doctor. This will help with discomfort.  · Wash your hands  often with soap and water. If there is no soap and water, use hand .  · Do not smoke. Avoid being around people who are smoking (secondhand smoke).  · Keep all follow-up visits as told by your doctor. This is important.    Contact a doctor if:  · You have a fever.  · Your symptoms get worse.  · Your symptoms do not get better within 10 days.  Get help right away if:  · You have a very bad headache.  · You cannot stop throwing up (vomiting).  · You have very bad pain or swelling around your face or eyes.  · You have trouble seeing.  · You feel confused.  · Your neck is stiff.  · You have trouble breathing.  Summary  · Sinusitis is swelling of your sinuses. Sinuses are hollow spaces in the bones around your face.  · This condition is caused by tissues in your nose that become inflamed or swollen. This traps germs. These can lead to infection.  · If you were prescribed an antibiotic medicine, take it as told by your doctor. Do not stop taking it even if you start to feel better.  · Keep all follow-up visits as told by your doctor. This is important.  This information is not intended to replace advice given to you by your health care provider. Make sure you discuss any questions you have with your health care provider.  Document Revised: 05/20/2019 Document Reviewed: 05/20/2019  [employer] Patient Education © 2021 [employer]

## 2022-02-18 ENCOUNTER — APPOINTMENT (OUTPATIENT)
Dept: ULTRASOUND IMAGING | Facility: HOSPITAL | Age: 27
End: 2022-02-18

## 2022-02-18 ENCOUNTER — HOSPITAL ENCOUNTER (OUTPATIENT)
Dept: ULTRASOUND IMAGING | Facility: HOSPITAL | Age: 27
Discharge: HOME | End: 2022-02-18
Admitting: INTERNAL MEDICINE

## 2022-02-18 PROCEDURE — 76536 US EXAM OF HEAD AND NECK: CPT

## 2022-03-02 ENCOUNTER — HOSPITAL ENCOUNTER (OUTPATIENT)
Dept: NUCLEAR MEDICINE | Facility: HOSPITAL | Age: 27
Discharge: HOME | End: 2022-03-02

## 2022-03-02 PROCEDURE — A9516 IODINE I-123 SOD IODIDE MIC: HCPCS | Performed by: INTERNAL MEDICINE

## 2022-03-02 PROCEDURE — 0 SODIUM IODIDE 3.7 MBQ CAPSULE: Performed by: INTERNAL MEDICINE

## 2022-03-02 PROCEDURE — 78014 THYROID IMAGING W/BLOOD FLOW: CPT

## 2022-03-02 RX ORDER — SODIUM IODIDE I 123 100 UCI/1
1 CAPSULE, GELATIN COATED ORAL
Status: COMPLETED | OUTPATIENT
Start: 2022-03-02 | End: 2022-03-02

## 2022-03-02 RX ADMIN — Medication 1 CAPSULE: at 13:56

## 2022-03-03 ENCOUNTER — HOSPITAL ENCOUNTER (OUTPATIENT)
Dept: NUCLEAR MEDICINE | Facility: HOSPITAL | Age: 27
Discharge: HOME | End: 2022-03-03

## 2022-03-04 ENCOUNTER — APPOINTMENT (OUTPATIENT)
Dept: ULTRASOUND IMAGING | Facility: HOSPITAL | Age: 27
End: 2022-03-04

## 2022-03-04 ENCOUNTER — OFFICE VISIT (OUTPATIENT)
Dept: ENDOCRINOLOGY | Age: 27
End: 2022-03-04

## 2022-03-04 VITALS — BODY MASS INDEX: 31.42 KG/M2 | HEART RATE: 96 BPM | HEIGHT: 67 IN | OXYGEN SATURATION: 98 % | WEIGHT: 200.2 LBS

## 2022-03-04 DIAGNOSIS — E05.90 HYPERTHYROIDISM: Primary | ICD-10-CM

## 2022-03-04 PROCEDURE — 99214 OFFICE O/P EST MOD 30 MIN: CPT | Performed by: INTERNAL MEDICINE

## 2022-03-04 RX ORDER — PROPRANOLOL HYDROCHLORIDE 10 MG/1
10 TABLET ORAL 3 TIMES DAILY
Qty: 90 TABLET | Refills: 0 | Status: SHIPPED | OUTPATIENT
Start: 2022-03-04 | End: 2022-04-22

## 2022-03-04 NOTE — PROGRESS NOTES
Chief Complaint  Hyperthyroidism    Ming Corona presents for follow-up for her hyperthyroidism.  She continues to experience sweating, high resting heart rate and tremors, especially after working out.  She is about 10 months post delivery.  Her thyroid function studies show an improvement in her TSH, however it remains suppressed.  Her free T4 and T3 numbers are normal.  Her TPO antibody as well as her TSI are negative.  Her liver function studies have improved.  The thyroid ultrasound comes back unremarkable.  Thyroid uptake and scan shows diffuse increase in uptake of the radioactive iodine tracer.    Results for RONI BYRNES (MRN [id number]) as of 3/4/2022 14:50   Ref. Range 1/19/2022 07:33 2/11/2022 10:14   TSH Baseline Latest Ref Range: 0.450 - 4.500 uIU/mL <0.005 (L) 0.029 (L)   Free T4 Latest Ref Range: 0.82 - 1.77 ng/dL  1.13   T3, Free Latest Ref Range: 2.0 - 4.4 pg/mL  4.0   Thyroid Peroxidase Antibody Latest Ref Range: 0 - 34 IU/mL  <8   Thyroid Stimulating Immunoglobulin Latest Ref Range: 0.00 - 0.55 IU/L  <0.10     Results for RONI BYRNES (MRN [id number]) as of 3/4/2022 14:50   Ref. Range 5/3/2021 06:42 1/19/2022 07:33 2/11/2022 10:14   WBC Latest Ref Range: 3.4 - 10.8 x10E3/uL 14.26 (H) 4.72 5.8     Results for RONI BYRNES (MRN [id number]) as of 3/4/2022 14:50   Ref. Range 1/19/2022 07:33 2/11/2022 10:14   eGFR Non  Am Latest Ref Range: >59 mL/min/1.73 116 112   Alkaline Phosphatase Latest Ref Range: 44 - 121 IU/L 76 77   Total Protein Latest Ref Range: 6.0 - 8.5 g/dL 7.0 7.2   ALT (SGPT) Latest Ref Range: 0 - 32 IU/L 111 (H) 23   AST (SGOT) Latest Ref Range: 0 - 40 IU/L 82 (H) 27   Total Bilirubin Latest Ref Range: 0.0 - 1.2 mg/dL 0.3 0.3   Albumin Latest Ref Range: 3.9 - 5.0 g/dL 3.90 4.2   Globulin Latest Units: gm/dL 3.1    A/G Ratio Latest Ref Range: 1.2 - 2.2  1.3 1.4     2/18/2022: US THYROID  COMPARISON: None  INDICATIONS:  Hyperthryoidism  TECHNIQUE:     High-resolution ultrasound examination of the thyroid gland was performed.    FINDINGS:          The right thyroid measures 5.3 x 1.2 x 1.2 centimeters.  The left thyroid measures 4.7 x 1.0 x 1.2   centimeter.  The thyroid isthmus measures 0.2 centimeters.  In the right lateral neck there is a   1.6 x 0.3 x 1.2 centimeter lymph node.  Thyroid parenchyma is normal.  Several normal sized left   neck lymph nodes seen.  IMPRESSION: Unremarkable thyroid.    3/3/2022: I-123 THYROID UPTAKE AND SCAN   HISTORY: Decreased TSH levels. Hyperthyroidism  COMPARISON: CT chest 2013. No previous thyroid imaging for  comparison  TECHNIQUE:   234 uCi of I-123 was ingested 24-hour uptake measurement  and thyroid gland imaging.  FINDINGS: There is homogenous thyroid gland uptake without focal hot or  cold nodule.  24 hour radioactive iodine uptake is  41% (normal 8-35%).  IMPRESSION: Increased 24 hour radioactive iodine uptake measuring 41% {normal 8-35%)          Chloe R Aftab presents to [facility]  History of Present Illness    26-year-old  female presents for evaluation and treatment recommendations for hyperthyroidism.    She has been in her usual state of health until last year. She is  and delivered a healthy baby boy on May 1, 2021.  She is currently on birth control. She is not nursing her child.    Soon after delivery she started experiencing nonspecific symptoms.  In 2021 she had an episode of Covid.  In 2022 her nonspecific symptoms had become worse.    She mainly experienced heat intolerance and tremors. She denies fatigue, anxiety, polyphagia, unexplained weight loss, muscle weakness.  She has had weight fluctuation since last year. Her prepregnancy weight was 175 lb. After delivery she weighed 200 lb. At home she now weighs 191 lb.   She has been dieting and lost about 12 lbs.     There is no family history of thyroid dysfunction.    Patient denies  "blurred vision, double vision, pain or swelling of the eyes.    Patient denies tobacco use    Patient denies dysphagia, odynophagia, dry cough, hoarseness of voice     There is no history of ionizing radiation to the head or neck.  There is no family history of thyroid cancer.    Patient is not on biotin, over the counter thyroid medications, Li, Amiodarone   She is on MV gummies, but started these after her blood draw  No history of recent IV contrast dye      PMH, Allergies and medication list reviewed.  -Note has been made of abnormal liver function studies. She is scheduled for a liver ultrasound on 12/18/2022.  She has not met with a gastroenterologist yet. Evaluation is in process.  FH- father has HTN and mother is healthy  Sister has T2 DM  SH- T/E/D,  with one child. She is a  at dental office        Most recent thyroid function studies:  Results for RONI BYRNES (MRN [id number]) as of 2/7/2022 11:09   Ref. Range 1/19/2022 07:33   TSH Baseline Latest Ref Range: 0.270 - 4.200 uIU/mL <0.005 (L)     Results for RONI BYRNES (MRN [id number]) as of 2/7/2022 11:09   Ref. Range 1/19/2022 07:33   ALT (SGPT) Latest Ref Range: 1 - 33 U/L 111 (H)   AST (SGOT) Latest Ref Range: 1 - 32 U/L 82 (H)       Thyroid imaging studies: None available.        Objective   Vital Signs:   Pulse 96   Ht 170.2 cm (67.01\")   Wt 90.8 kg (200 lb 3.2 oz)   SpO2 98%   BMI 31.35 kg/m²     Physical Exam  Vitals and nursing note reviewed.   Constitutional:       General: She is not in acute distress.     Appearance: She is not ill-appearing, toxic-appearing or diaphoretic.   HENT:      Head: Normocephalic and atraumatic.      Nose: Nose normal.      Mouth/Throat:      Mouth: Mucous membranes are moist.      Pharynx: Oropharynx is clear. No oropharyngeal exudate or posterior oropharyngeal erythema.   Eyes:      General: No scleral icterus.     Extraocular Movements: Extraocular movements intact.      " Conjunctiva/sclera: Conjunctivae normal.      Pupils: Pupils are equal, round, and reactive to light.      Comments: There is no lid lag or lid retraction. There is no exophthalmos noted. There is no redness or swelling of the conjunctiva. There is no redness or swelling around the eyes.   Neck:      Thyroid: No thyroid mass, thyromegaly or thyroid tenderness.      Vascular: No carotid bruit.      Trachea: Trachea normal.   Cardiovascular:      Rate and Rhythm: Normal rate and regular rhythm.      Pulses: Normal pulses.      Heart sounds: Normal heart sounds. No murmur heard.  No friction rub. No gallop.    Pulmonary:      Effort: Pulmonary effort is normal. No respiratory distress.      Breath sounds: Normal breath sounds. No stridor. No wheezing, rhonchi or rales.   Chest:      Chest wall: No tenderness.   Abdominal:      General: Bowel sounds are normal. There is no distension.      Palpations: Abdomen is soft. There is no mass.      Tenderness: There is no abdominal tenderness. There is no rebound.   Musculoskeletal:         General: No swelling, tenderness, deformity or signs of injury. Normal range of motion.      Cervical back: Normal range of motion and neck supple. No rigidity or tenderness.      Right lower leg: No edema.      Left lower leg: No edema.   Lymphadenopathy:      Cervical: No cervical adenopathy.   Skin:     General: Skin is warm and dry.      Capillary Refill: Capillary refill takes 2 to 3 seconds.      Coloration: Skin is not jaundiced or pale.      Findings: No bruising, erythema, lesion or rash.   Neurological:      General: No focal deficit present.      Mental Status: She is alert and oriented to person, place, and time. Mental status is at baseline.      Cranial Nerves: No cranial nerve deficit.      Sensory: No sensory deficit.      Motor: No weakness.      Coordination: Coordination normal.      Gait: Gait normal.      Deep Tendon Reflexes: Reflexes normal.      Comments: Minimal fine  tremor on outstretched hands, right more than left.   Psychiatric:         Mood and Affect: Mood normal.         Behavior: Behavior normal.         Thought Content: Thought content normal.         Judgment: Judgment normal.        Result Review :     TSH    TSH 1/19/22 2/11/22   TSH <0.005 (A) 0.029 (A)   (A) Abnormal value                      Assessment and Plan    Diagnoses and all orders for this visit:    1. Hyperthyroidism (Primary)  -     Cancel: TSH; Future  -     Cancel: T4, Free; Future  -     Cancel: T3; Future  -     Cancel: Thyroid Stimulating Immunoglobulin; Future  -     Cancel: WBC; Future  -     Cancel: Hepatic Function Panel; Future  -     Cancel: Thyrotropin Receptor Antibody; Future  -     Cancel: Thyroid Peroxidase Antibody; Future  -     propranolol (INDERAL) 10 MG tablet; Take 1 tablet by mouth 3 (Three) Times a Day for 30 days. Take 1 tab PO TID/ as needed for high HR > 100 bpm, tremors, anxiety, etc (symptoms of hyperthyroidism).  Hold medication if HR is in 60s bpm or  mmHg or below.  Dispense: 90 tablet; Refill: 0  -     Thyroid Peroxidase Antibody; Future  -     Hepatic Function Panel; Future  -     WBC; Future  -     Thyroid Stimulating Immunoglobulin; Future  -     T3; Future  -     T4, Free; Future  -     TSH; Future  -     Thyrotropin Receptor Antibody; Future  -     T3, Free; Future      The patient has minimal symptoms of hyperthyroidism.  Her labs show a suppressed TSH with a negative TSI.  Her nuclear medicine thyroid uptake and scan shows diffuse uptake.    We discussed the differential diagnosis, though I suspect this is postpartum thyroiditis versus Graves' disease, with increased probability of Graves' disease given the timeline.  Her TSI is negative, but her nuclear medicine thyroid uptake and scan shows diffuse uptake.    We will repeat the thyroid function studies with antibodies in 6 weeks from now.      We will give her low-dose propranolol for as needed use for  her symptoms.    We will have her follow-up in 6 to 8 weeks.    Follow Up   Return in about 8 weeks (around 4/29/2022).  Patient was given instructions and counseling regarding her condition or for health maintenance advice. Please see specific information pulled into the AVS if appropriate.

## 2022-04-22 ENCOUNTER — OFFICE VISIT (OUTPATIENT)
Dept: FAMILY MEDICINE CLINIC | Age: 27
End: 2022-04-22

## 2022-04-22 ENCOUNTER — LAB (OUTPATIENT)
Dept: LAB | Facility: HOSPITAL | Age: 27
End: 2022-04-22

## 2022-04-22 VITALS
WEIGHT: 192.2 LBS | HEART RATE: 82 BPM | HEIGHT: 67 IN | OXYGEN SATURATION: 98 % | SYSTOLIC BLOOD PRESSURE: 102 MMHG | DIASTOLIC BLOOD PRESSURE: 72 MMHG | BODY MASS INDEX: 30.17 KG/M2

## 2022-04-22 DIAGNOSIS — E05.90: ICD-10-CM

## 2022-04-22 DIAGNOSIS — E05.90 HYPERTHYROIDISM: Primary | ICD-10-CM

## 2022-04-22 DIAGNOSIS — Z00.00 WELL ADULT EXAM: Primary | ICD-10-CM

## 2022-04-22 LAB
ALBUMIN SERPL-MCNC: 4.5 G/DL (ref 3.5–5.2)
ALP SERPL-CCNC: 86 U/L (ref 39–117)
ALT SERPL W P-5'-P-CCNC: 17 U/L (ref 1–33)
AST SERPL-CCNC: 25 U/L (ref 1–32)
BILIRUB CONJ SERPL-MCNC: <0.2 MG/DL (ref 0–0.3)
BILIRUB INDIRECT SERPL-MCNC: NORMAL MG/DL
BILIRUB SERPL-MCNC: 0.2 MG/DL (ref 0–1.2)
PROT SERPL-MCNC: 7.5 G/DL (ref 6–8.5)
T3 SERPL-MCNC: 147 NG/DL (ref 80–200)
T4 FREE SERPL-MCNC: 0.92 NG/DL (ref 0.93–1.7)
TSH SERPL DL<=0.05 MIU/L-ACNC: 3.31 UIU/ML (ref 0.27–4.2)
WBC # BLD AUTO: 9.75 10*3/MM3 (ref 3.4–10.8)

## 2022-04-22 PROCEDURE — 84443 ASSAY THYROID STIM HORMONE: CPT

## 2022-04-22 PROCEDURE — 80076 HEPATIC FUNCTION PANEL: CPT

## 2022-04-22 PROCEDURE — 83520 IMMUNOASSAY QUANT NOS NONAB: CPT

## 2022-04-22 PROCEDURE — 84445 ASSAY OF TSI GLOBULIN: CPT

## 2022-04-22 PROCEDURE — 99395 PREV VISIT EST AGE 18-39: CPT | Performed by: NURSE PRACTITIONER

## 2022-04-22 PROCEDURE — 86800 THYROGLOBULIN ANTIBODY: CPT

## 2022-04-22 PROCEDURE — 85048 AUTOMATED LEUKOCYTE COUNT: CPT

## 2022-04-22 PROCEDURE — 84439 ASSAY OF FREE THYROXINE: CPT

## 2022-04-22 PROCEDURE — 84481 FREE ASSAY (FT-3): CPT | Performed by: INTERNAL MEDICINE

## 2022-04-22 PROCEDURE — 84480 ASSAY TRIIODOTHYRONINE (T3): CPT

## 2022-04-22 PROCEDURE — 86376 MICROSOMAL ANTIBODY EACH: CPT

## 2022-04-22 RX ORDER — CETIRIZINE HYDROCHLORIDE 10 MG/1
10 TABLET ORAL DAILY
COMMUNITY

## 2022-04-22 NOTE — ASSESSMENT & PLAN NOTE
Preventive care measures are discussed.  Discussed seasonal allergy symptoms and use of plain antihistamines and nasal steroid sprays as needed.  Follow-up for any persisting concerns.

## 2022-04-22 NOTE — PROGRESS NOTES
"Chief Complaint  Annual Exam    Subjective  Patient is a 26-year-old female that is here today for an annual wellness exam.  She is currently seeing endocrinology for hyperthyroidism.  She states her levels are coming down and endocrinologist thinks it is postpartum hyperthyroidism.  Last menstrual cycle was 2 weeks ago.  Last Pap smear was in 2021.  She is up-to-date on immunizations and recalls receiving HPV vaccine.  Wears contact lenses and gets an eye exam every November.  She denies concerns today.  Lipid panel normal in 2020.  Patient states she does not need to have that checked again until 2023.        Chloe Ugalde presents to [facility]          Objective   Vital Signs:   Vitals:    04/22/22 0822   BP: 102/72   Pulse: 82   SpO2: 98%   Weight: 87.2 kg (192 lb 3.2 oz)   Height: 170.2 cm (67.01\")      Body mass index is 30.1 kg/m².  Physical Exam  Vitals reviewed.   Constitutional:       General: She is not in acute distress.     Appearance: Normal appearance. She is well-developed.   HENT:      Right Ear: Tympanic membrane normal.      Left Ear: Tympanic membrane normal.      Mouth/Throat:      Mouth: Mucous membranes are moist.      Pharynx: Oropharynx is clear. No oropharyngeal exudate or posterior oropharyngeal erythema.   Eyes:      Extraocular Movements: Extraocular movements intact.      Pupils: Pupils are equal, round, and reactive to light.   Neck:      Thyroid: No thyroid mass, thyromegaly or thyroid tenderness.   Cardiovascular:      Rate and Rhythm: Normal rate and regular rhythm.      Pulses:           Posterior tibial pulses are 2+ on the right side and 2+ on the left side.      Heart sounds: Normal heart sounds.   Pulmonary:      Effort: Pulmonary effort is normal.      Breath sounds: Normal breath sounds.   Abdominal:      General: Abdomen is flat. Bowel sounds are normal. There is no distension.      Palpations: Abdomen is soft. There is no mass.      " Tenderness: There is no abdominal tenderness.   Musculoskeletal:      Right lower leg: No edema.      Left lower leg: No edema.   Skin:     General: Skin is warm and dry.   Neurological:      General: No focal deficit present.      Mental Status: She is alert.   Psychiatric:         Attention and Perception: Attention normal.         Mood and Affect: Mood and affect normal.         Behavior: Behavior normal.          Result Review :     CMP    CMP 1/19/22 2/11/22   Glucose 92 85   BUN 8 7   Creatinine 0.62 0.74   eGFR Non  Am 116 112   eGFR African Am 141 129   Sodium 138 140   Potassium 4.4 5.0   Chloride 105 106   Calcium 9.2 9.6   Total Protein  7.2   Albumin 3.90 4.2   Globulin  3.0   Total Bilirubin 0.3 0.3   Alkaline Phosphatase 76 77   AST (SGOT) 82 (A) 27   ALT (SGPT) 111 (A) 23   (A) Abnormal value       Comments are available for some flowsheets but are not being displayed.           CBC    CBC 5/3/21 1/19/22 2/11/22   WBC 14.26 (A) 4.72 5.8   RBC 3.92 4.89 5.13   Hemoglobin 12.1 13.9 14.6   Hematocrit 35.8 40.9 43.1   MCV 91.3 83.6 84   MCH 30.9 28.4 28.5   MCHC 33.8 34.0 33.9   RDW 13.2 13.1 13.4   Platelets 236 340 261   (A) Abnormal value            CBC w/diff    CBC w/Diff 5/3/21 1/19/22 2/11/22   WBC 14.26 (A) 4.72 5.8   RBC 3.92 4.89 5.13   Hemoglobin 12.1 13.9 14.6   Hematocrit 35.8 40.9 43.1   MCV 91.3 83.6 84   MCH 30.9 28.4 28.5   MCHC 33.8 34.0 33.9   RDW 13.2 13.1 13.4   Platelets 236 340 261   Neutrophil Rel % 73.1 34.5 (A) 56   Immature Granulocyte Rel % 0.7 (A) 0.2    Lymphocyte Rel % 17.8 (A) 51.1 (A) 32   Monocyte Rel % 7.6 12.1 (A) 10   Eosinophil Rel % 0.6 1.7 1   Basophil Rel % 0.2 0.4 1   (A) Abnormal value                TSH    TSH 1/19/22 2/11/22   TSH <0.005 (A) 0.029 (A)   (A) Abnormal value                     Assessment and Plan {WrapupProblem List  Visit Diagnosis  ROS  Review (Popup)  Health Maintenance  Quality  BestPractice  Medications  SmartSets  SnapShot  Encounters  Media :23}   Diagnoses and all orders for this visit:    1. Well adult exam (Primary)  Assessment & Plan:  Preventive care measures are discussed.  Discussed seasonal allergy symptoms and use of plain antihistamines and nasal steroid sprays as needed.  Follow-up for any persisting concerns.        Follow Up    No follow-ups on file.  Patient was given instructions and counseling regarding her condition or for health maintenance advice. Please see specific information pulled into the AVS if appropriate.

## 2022-04-23 LAB — THYROPEROXIDASE AB SERPL-ACNC: 11 IU/ML (ref 0–34)

## 2022-04-24 LAB — TSI SER-ACNC: <0.1 IU/L (ref 0–0.55)

## 2022-04-25 LAB
THYROGLOB AB SERPL-ACNC: <1 IU/ML (ref 0–0.9)
TSH RECEP AB SER-ACNC: <1.1 IU/L (ref 0–1.75)

## 2022-04-28 NOTE — PROGRESS NOTES
Chief Complaint  No chief complaint on file.    Ming Corona presents for follow-up for her hyperthyroidism.  This is a VIDEO meeting.    She is about 12 months post delivery.  She has had an improvement in sweating and feels well.    Her thyroid function studies show an improvement and her TSH is in the normal reference range.  Her free T4 is slightly low and T3 is normal.  Her TPO antibody as well as her TSI are negative.  Her liver function studies have improved.    Previous imaging studies show the thyroid ultrasound was unremarkable.  Thyroid uptake and scan shows diffuse increase in uptake of the radioactive iodine tracer.     Latest Reference Range & Units 01/19/22 07:33 02/11/22 10:14 04/22/22 08:52   TSH Baseline 0.270 - 4.200 uIU/mL <0.005 (L) 0.029 (L) 3.310   Free T4 0.93 - 1.70 ng/dL  1.13 0.92 (L)   T3, Total 80.0 - 200.0 ng/dl   147.0   T3, Free 2.0 - 4.4 pg/mL  4.0    Thyroglobulin Ab 0.0 - 0.9 IU/mL   <1.0 [1]   Thyroid Peroxidase Antibody 0 - 34 IU/mL  <8 11   Thyroid Stimulating Immunoglobulin 0.00 - 0.55 IU/L  <0.10 <0.10   Thyrotropin Receptor Antibody 0.00 - 1.75 IU/L   <1.10      Latest Reference Range & Units 01/19/22 07:33 02/11/22 10:14 04/22/22 08:52   ALT (SGPT) 1 - 33 U/L 111 (H) 23 17   AST (SGOT) 1 - 32 U/L 82 (H) 27 25      Latest Reference Range & Units 01/19/22 07:33 02/11/22 10:14 04/22/22 08:52   WBC 3.40 - 10.80 10*3/mm3 4.72 5.8 9.75           2/18/2022: US THYROID  COMPARISON: None  INDICATIONS:  Hyperthryoidism  TECHNIQUE:    High-resolution ultrasound examination of the thyroid gland was performed.    FINDINGS:          The right thyroid measures 5.3 x 1.2 x 1.2 centimeters.  The left thyroid measures 4.7 x 1.0 x 1.2   centimeter.  The thyroid isthmus measures 0.2 centimeters.  In the right lateral neck there is a   1.6 x 0.3 x 1.2 centimeter lymph node.  Thyroid parenchyma is normal.  Several normal sized left   neck lymph nodes seen.  IMPRESSION: Unremarkable  thyroid.    3/3/2022: I-123 THYROID UPTAKE AND SCAN   HISTORY: Decreased TSH levels. Hyperthyroidism  COMPARISON: CT chest 2013. No previous thyroid imaging for  comparison  TECHNIQUE:   234 uCi of I-123 was ingested 24-hour uptake measurement  and thyroid gland imaging.  FINDINGS: There is homogenous thyroid gland uptake without focal hot or  cold nodule.  24 hour radioactive iodine uptake is  41% (normal 8-35%).  IMPRESSION: Increased 24 hour radioactive iodine uptake measuring 41% {normal 8-35%)          Chloe Ugalde presents to [facility] ENDOCRINOLOG[facility]  Hyperthyroidism        26-year-old  female presents for evaluation and treatment recommendations for hyperthyroidism.    She has been in her usual state of health until last year. She is  and delivered a healthy baby boy on May 1, 2021.  She is currently on birth control. She is not nursing her child.    Soon after delivery she started experiencing nonspecific symptoms.  In 2021 she had an episode of Covid.  In  [facility] her nonspecific symptoms had become worse.    She mainly experienced heat intolerance and tremors. She denies fatigue, anxiety, polyphagia, unexplained weight loss, muscle weakness.  She has had weight fluctuation since last year. Her prepregnancy weight was 175 lb. After delivery she weighed 200 lb. At home she now weighs 191 lb.   She has been dieting and lost about 12 lbs.     There is no family history of thyroid dysfunction.    Patient denies blurred vision, double vision, pain or swelling of the eyes.    Patient denies tobacco use    Patient denies dysphagia, odynophagia, dry cough, hoarseness of voice     There is no history of ionizing radiation to the head or neck.  There is no family history of thyroid cancer.    Patient is not on biotin, over the counter thyroid medications, Li, Amiodarone   She is on MV gummies, but started these after her blood draw  No history of recent IV  contrast dye      PMH, Allergies and medication list reviewed.  -Note has been made of abnormal liver function studies. She is scheduled for a liver ultrasound on 12/18/2022.  She has not met with a gastroenterologist yet. Evaluation is in process.  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 Range 1/19/2022 07:33   TSH Baseline Latest Ref Range: 0.270 - 4.200 uIU/mL <0.005 (L)     Results for RONI BYRNES (MRN [id number]) as of 2/7/2022 11:09   Ref. Range 1/19/2022 07:33   ALT (SGPT) Latest Ref Range: 1 - 33 U/L 111 (H)   AST (SGOT) Latest Ref Range: 1 - 32 U/L 82 (H)       Thyroid imaging studies: None available.        Objective   Vital Signs:   There were no vitals taken for this visit.       This is a video visit and physical exam is limited.  The patient appears alert and oriented.  She is cooperative in the interview.  Her head is atraumatic normocephalic.  Extraocular eye movements are normal.  There is no redness of the conjunctiva or swelling around the eyes.  On gross inspection, her thyroid gland is not enlarged.  There is no respiratory distress.  She is moving her upper extremities without any difficulties.      Result Review :     TSH    TSH 1/19/22 2/11/22 4/22/22   TSH <0.005 (A) 0.029 (A) 3.310   (A) Abnormal value                      Assessment and Plan    Diagnoses and all orders for this visit:    1. Hyperthyroidism (Primary)  -     TSH; Future  -     T4, Free; Future  -     T3, Free; Future  -     T3; Future  -     Thyroid Stimulating Immunoglobulin; Future  -     CBC Auto Differential; Future  -     Comprehensive Metabolic Panel; Future      The patient's symptoms of hyperthyroidism have resolved.    Her labs show her TSH has normalized.  She has a negative TSI.  Her nuclear  medicine thyroid uptake and scan shows diffuse uptake.  Thyroid ultrasound is normal.    We discussed the differential diagnosis, though I suspect this is postpartum thyroiditis versus Graves' disease.    We will repeat the thyroid function studies with antibodies in 6 weeks from now.      She can stop the low-dose propranolol if she does not continue to have a high resting heart rate.     We will have her follow-up in 6 to 8 weeks.    Follow Up   No follow-ups on file.  Patient was given instructions and counseling regarding her condition or for health maintenance advice. Please see specific information pulled into the AVS if appropriate.

## 2022-04-29 ENCOUNTER — TELEMEDICINE (OUTPATIENT)
Dept: ENDOCRINOLOGY | Age: 27
End: 2022-04-29

## 2022-04-29 DIAGNOSIS — E05.90 HYPERTHYROIDISM: Primary | ICD-10-CM

## 2022-04-29 PROCEDURE — 99214 OFFICE O/P EST MOD 30 MIN: CPT | Performed by: INTERNAL MEDICINE

## 2022-06-22 ENCOUNTER — LAB (OUTPATIENT)
Dept: LAB | Facility: HOSPITAL | Age: 27
End: 2022-06-22

## 2022-06-22 PROCEDURE — 84481 FREE ASSAY (FT-3): CPT | Performed by: INTERNAL MEDICINE

## 2022-06-22 PROCEDURE — 84443 ASSAY THYROID STIM HORMONE: CPT | Performed by: INTERNAL MEDICINE

## 2022-06-22 PROCEDURE — 80053 COMPREHEN METABOLIC PANEL: CPT | Performed by: INTERNAL MEDICINE

## 2022-06-22 PROCEDURE — 84439 ASSAY OF FREE THYROXINE: CPT | Performed by: INTERNAL MEDICINE

## 2022-06-22 PROCEDURE — 85025 COMPLETE CBC W/AUTO DIFF WBC: CPT | Performed by: INTERNAL MEDICINE

## 2022-06-22 PROCEDURE — 84445 ASSAY OF TSI GLOBULIN: CPT | Performed by: INTERNAL MEDICINE

## 2022-06-27 NOTE — PROGRESS NOTES
Chief Complaint  No chief complaint on file.        Ming Corona presents for follow-up for her hyperthyroidism.  She feels well and has a good sense of wellbeing.  She denies any symptoms and is clinically euthyroid.  She is about 12 months post delivery.  Her thyroid function studies show gradual normalization of her TSH, free T4 and T3.    Her TPO antibody as well as her TSI are negative.  Her liver function studies have improved and normalized.  The thyroid ultrasound comes back unremarkable.  Thyroid uptake and scan from March 2022 showed diffuse increase in uptake of the radioactive iodine tracer.   Latest Reference Range & Units 02/11/22 10:14 04/22/22 08:52 06/22/22 07:27   TSH Baseline 0.450 - 4.500 uIU/mL 0.029 (L) 3.310 3.620   Free T4 0.82 - 1.77 ng/dL 1.13 0.92 (L) 0.96   T3, Total 71 - 180 ng/dL  147.0 149   T3, Free 2.0 - 4.4 pg/mL 4.0  3.0   Thyroglobulin Ab 0.0 - 0.9 IU/mL  <1.0 [1]    Thyroid Peroxidase Antibody 0 - 34 IU/mL <8 11    Thyroid Stimulating Immunoglobulin 0.00 - 0.55 IU/L <0.10 <0.10 <0.10   Thyrotropin Receptor Antibody 0.00 - 1.75 IU/L  <1.10      2/18/2022: US THYROID  COMPARISON: None  INDICATIONS:  Hyperthryoidism  TECHNIQUE:    High-resolution ultrasound examination of the thyroid gland was performed.    FINDINGS:          The right thyroid measures 5.3 x 1.2 x 1.2 centimeters.  The left thyroid measures 4.7 x 1.0 x 1.2 centimeter.  The thyroid isthmus measures 0.2 centimeters.  In the right lateral neck there is a 1.6 x 0.3 x 1.2 centimeter lymph node.  Thyroid parenchyma is normal.  Several normal sized left neck lymph nodes seen.  IMPRESSION: Unremarkable thyroid.    3/3/2022: I-123 THYROID UPTAKE AND SCAN   HISTORY: Decreased TSH levels. Hyperthyroidism  COMPARISON: CT chest 09/03/2013. No previous thyroid imaging for  comparison  TECHNIQUE:   234 uCi of I-123 was ingested 24-hour uptake measurement  and thyroid gland imaging.  FINDINGS: There is homogenous  thyroid gland uptake without focal hot or  cold nodule.  24 hour radioactive iodine uptake is  41% (normal 8-35%).  IMPRESSION: Increased 24 hour radioactive iodine uptake measuring 41% {normal 8-35%)          Chloe Ugalde presents to [facility] END[facility]OLOG[facility]  Hyperthyroidism        26-year-old  female presents for evaluation and treatment recommendations for hyperthyroidism.    She has been in her usual state of health until last year. She is  and delivered a healthy baby boy on May 1, 2021.  She is currently on birth control. She is not nursing her child.    Soon after delivery she started experiencing nonspecific symptoms.  In 2021 she had an episode of Covid.  In 2022 her nonspecific symptoms had become worse.    She mainly experienced heat intolerance and tremors. She denies fatigue, anxiety, polyphagia, unexplained weight loss, muscle weakness.  She has had weight fluctuation since last year. Her prepregnancy weight was 175 lb. After delivery she weighed 200 lb. At home she now weighs 191 lb.   She has been dieting and lost about 12 lbs.     There is no family history of thyroid dysfunction.    Patient denies blurred vision, double vision, pain or swelling of the eyes.    Patient denies tobacco use    Patient denies dysphagia, odynophagia, dry cough, hoarseness of voice     There is no history of ionizing radiation to the head or neck.  There is no family history of thyroid cancer.    Patient is not on biotin, over the counter thyroid medications, Li, Amiodarone   She is on MV gummies, but started these after her blood draw  No history of recent IV contrast dye      PMH, Allergies and medication list reviewed.  -Note has been made of abnormal liver function studies. She is scheduled for a liver ultrasound on 2022.  She has not met with a gastroenterologist yet. Evaluation is in process.  FH- father has HTN and mother is healthy  Sister has T2  DM  SH- T/E/D,  with one child. She is a  at dental office        Most recent thyroid function studies:  Results for RONI BYRNES (MRN [id number]) as of 2/7/2022 11:09   Ref. Range 1/19/2022 07:33   TSH Baseline Latest Ref Range: 0.270 - 4.200 uIU/mL <0.005 (L)     Results for RONI BYRNES (MRN [id number]) as of 2/7/2022 11:09   Ref. Range 1/19/2022 07:33   ALT (SGPT) Latest Ref Range: 1 - 33 U/L 111 (H)   AST (SGOT) Latest Ref Range: 1 - 32 U/L 82 (H)       Thyroid imaging studies: None available.        Objective   Vital Signs:   Physical exam is limited due to this appointment being a visit due to visit.  The patient is alert and cooperative.  She is in no obvious distress.  HEENT evaluation shows head is normocephalic and atraumatic.  Extraocular movements are intact.  Trachea is central and there is no enlargement of the thyroid gland on inspection.  CNS exam is grossly normal.     Result Review :     TSH    TSH 2/11/22 4/22/22 6/22/22   TSH 0.029 (A) 3.310 3.620   (A) Abnormal value                     Assessment and Plan    Diagnoses and all orders for this visit:    1. Hyperthyroidism (Primary)      The patient is clinically euthyroid with normal thyroid function studies and a negative TSI.  Her nuclear medicine thyroid uptake and scan from March 2022 showed diffuse uptake.  Her thyroid ultrasound is normal.    We discussed the differential diagnosis, i.e. postpartum thyroiditis versus Graves' disease, with increased probability of Graves' disease given the timeline.  Her TSI is negative, but her nuclear medicine thyroid uptake and scan shows diffuse uptake.    We will repeat the thyroid function studies with antibodies in 3 months from now and will consider decreasing the frequency to every 6months to 12 months.      Patient is interested in conceiving again.  At this time I advised to monitor her thyroid function studies for few more months before making a decision to conceive  again.      Follow Up   No follow-ups on file.    Follow-up in 3 months, with labs 1 week prior to your follow-up visit.    Patient was given instructions and counseling regarding her condition or for health maintenance advice. Please see specific information pulled into the AVS if appropriate.

## 2022-06-28 ENCOUNTER — TELEMEDICINE (OUTPATIENT)
Dept: ENDOCRINOLOGY | Age: 27
End: 2022-06-28

## 2022-06-28 DIAGNOSIS — E05.90 HYPERTHYROIDISM: Primary | ICD-10-CM

## 2022-06-28 PROCEDURE — 99214 OFFICE O/P EST MOD 30 MIN: CPT | Performed by: INTERNAL MEDICINE

## 2023-04-26 ENCOUNTER — OFFICE VISIT (OUTPATIENT)
Dept: FAMILY MEDICINE CLINIC | Age: 28
End: 2023-04-26
Payer: COMMERCIAL

## 2023-04-26 VITALS
HEIGHT: 67 IN | BODY MASS INDEX: 30.32 KG/M2 | SYSTOLIC BLOOD PRESSURE: 113 MMHG | WEIGHT: 193.2 LBS | OXYGEN SATURATION: 99 % | DIASTOLIC BLOOD PRESSURE: 79 MMHG | HEART RATE: 81 BPM

## 2023-04-26 DIAGNOSIS — Z00.00 WELL ADULT EXAM: ICD-10-CM

## 2023-04-26 DIAGNOSIS — R21 RASH: Primary | ICD-10-CM

## 2023-04-26 PROBLEM — J01.10 ACUTE NON-RECURRENT FRONTAL SINUSITIS: Status: RESOLVED | Noted: 2022-01-14 | Resolved: 2023-04-26

## 2023-04-26 PROBLEM — R61 NIGHT SWEATS: Status: RESOLVED | Noted: 2022-01-14 | Resolved: 2023-04-26

## 2023-04-26 RX ORDER — TRIAMCINOLONE ACETONIDE 1 MG/G
1 CREAM TOPICAL 2 TIMES DAILY
Qty: 15 G | Refills: 0 | Status: SHIPPED | OUTPATIENT
Start: 2023-04-26

## 2023-04-26 NOTE — ASSESSMENT & PLAN NOTE
Patient will call back and let me know if she wishes to get the following labs- CBC, CMP, TSH, fasting lipid panel, or hemoglobin A1c.  Preventive care measures are discussed.

## 2023-04-26 NOTE — PROGRESS NOTES
"Chief Complaint  Annual Exam (Physical )    Subjective          Chloe Ugalde presents to [facility]     Patient is a 27-year-old female who is here today for an annual physical.  Has been released from endocrinology as thyroid levels have returned to normal.  History of postpartum hyperthyroidism.  She reports her last thyroid level was checked by her OB/GYN in August and was normal.  Last menstrual cycle was 3 weeks ago.  Not currently using any contraception.  Hopes to conceive soon.  Recently had labs done per OB on Friday but uncertain what those labs included.  Wears glasses and contact lenses.  Last eye exam in November.  Last dental exam approximately 8 months ago.  She is up-to-date on tetanus vaccination.   flu vaccine oct 2022 at pharmacy.  Denies any problems with appetite or sleep.    Reports a scant rash on her right foot that she thinks is resolving poison ivy.  Has been present for about a week and improving but not resolved      Objective   Vital Signs:   Vitals:    04/26/23 0804   BP: 113/79   BP Location: Left arm   Patient Position: Sitting   Cuff Size: Adult   Pulse: 81   SpO2: 99%   Weight: 87.6 kg (193 lb 3.2 oz)   Height: 170.2 cm (67\")      Body mass index is 30.26 kg/m².  Physical Exam  Vitals reviewed.   Constitutional:       General: She is not in acute distress.     Appearance: Normal appearance. She is well-developed.   HENT:      Right Ear: Tympanic membrane normal.      Left Ear: Tympanic membrane normal.      Mouth/Throat:      Mouth: Mucous membranes are moist.      Pharynx: Oropharynx is clear. No oropharyngeal exudate or posterior oropharyngeal erythema.   Eyes:      Extraocular Movements: Extraocular movements intact.      Pupils: Pupils are equal, round, and reactive to light.   Neck:      Thyroid: No thyromegaly.   Cardiovascular:      Rate and Rhythm: Normal rate and regular rhythm.      Pulses:           Posterior tibial pulses are 2+ on " the right side and 2+ on the left side.      Heart sounds: Normal heart sounds.   Pulmonary:      Effort: Pulmonary effort is normal.      Breath sounds: Normal breath sounds.   Abdominal:      General: Abdomen is flat. Bowel sounds are normal. There is no distension.      Palpations: Abdomen is soft.      Tenderness: There is no guarding or rebound.   Musculoskeletal:         General: Normal range of motion.      Right lower leg: No edema.      Left lower leg: No edema.   Skin:     General: Skin is warm and dry.      Comments: Scant linear rash medial right foot   Neurological:      General: No focal deficit present.      Mental Status: She is alert.   Psychiatric:         Attention and Perception: Attention normal.         Mood and Affect: Mood and affect normal.         Behavior: Behavior normal.           Current Outpatient Medications:   •  cetirizine (zyrTEC) 10 MG tablet, Take 1 tablet by mouth Daily., Disp: , Rfl:   •  triamcinolone (KENALOG) 0.1 % cream, Apply 1 application topically to the appropriate area as directed 2 (Two) Times a Day., Disp: 15 g, Rfl: 0   Past Medical History:   Diagnosis Date   • Duodenal ulcer    • Migraines    • Sepsis    • VRE (vancomycin resistant enterococcus) culture positive 08/2013         Result Review :     CMP        6/22/2022    07:27   CMP   Glucose 81     BUN 10     Creatinine 0.87     Sodium 141     Potassium 5.1     Chloride 105     Calcium 9.2     Total Protein 6.7     Albumin 4.1     Globulin 2.6     Total Bilirubin <0.2     Alkaline Phosphatase 80     AST (SGOT) 21     ALT (SGPT) 13     BUN/Creatinine Ratio 11       CBC        6/22/2022    07:27   CBC   WBC 5.80     RBC 4.55     Hemoglobin 13.2     Hematocrit 40.0     MCV 87.9     MCH 29.0     MCHC 33.0     RDW 13.1     Platelets 308       CBC w/diff        6/22/2022    07:27   CBC w/Diff   WBC 5.80     RBC 4.55     Hemoglobin 13.2     Hematocrit 40.0     MCV 87.9     MCH 29.0     MCHC 33.0     RDW 13.1      Platelets 308     Neutrophil Rel % 45.8     Immature Granulocyte Rel % 0.0     Lymphocyte Rel % 46.7     Monocyte Rel % 6.4     Eosinophil Rel % 0.9     Basophil Rel % 0.2           TSH        6/22/2022    07:27   TSH   TSH 3.620                    Assessment and Plan    Diagnoses and all orders for this visit:    1. Rash (Primary)  Assessment & Plan:  Already on Zyrtec.  Improving.  Follow-up if does not resolve.    Orders:  -     triamcinolone (KENALOG) 0.1 % cream; Apply 1 application topically to the appropriate area as directed 2 (Two) Times a Day.  Dispense: 15 g; Refill: 0    2. Well adult exam  Assessment & Plan:  Patient will call back and let me know if she wishes to get the following labs- CBC, CMP, TSH, fasting lipid panel, or hemoglobin A1c.  Preventive care measures are discussed.        Follow Up    No follow-ups on file.  Patient was given instructions and counseling regarding her condition or for health maintenance advice. Please see specific information pulled into the AVS if appropriate.

## 2024-01-12 LAB
EXTERNAL HEPATITIS B SURFACE ANTIGEN: NEGATIVE
EXTERNAL HEPATITIS C AB: NEGATIVE
EXTERNAL RUBELLA QUALITATIVE: NORMAL
EXTERNAL SYPHILIS RPR SCREEN: NORMAL
HIV1 P24 AG SERPL QL IA: NEGATIVE

## 2024-05-19 ENCOUNTER — TELEMEDICINE (OUTPATIENT)
Dept: FAMILY MEDICINE CLINIC | Facility: TELEHEALTH | Age: 29
End: 2024-05-19
Payer: COMMERCIAL

## 2024-05-19 DIAGNOSIS — J01.40 ACUTE PANSINUSITIS, RECURRENCE NOT SPECIFIED: Primary | ICD-10-CM

## 2024-05-19 PROCEDURE — 99213 OFFICE O/P EST LOW 20 MIN: CPT | Performed by: NURSE PRACTITIONER

## 2024-05-19 RX ORDER — AMOXICILLIN 875 MG/1
875 TABLET, COATED ORAL 2 TIMES DAILY
Qty: 14 TABLET | Refills: 0 | Status: SHIPPED | OUTPATIENT
Start: 2024-05-19 | End: 2024-05-26

## 2024-05-19 RX ORDER — LEVOTHYROXINE SODIUM 0.03 MG/1
25 TABLET ORAL DAILY
COMMUNITY

## 2024-05-19 RX ORDER — PRENATAL VIT/IRON FUM/FOLIC AC 27MG-0.8MG
TABLET ORAL DAILY
COMMUNITY

## 2024-05-19 NOTE — PATIENT INSTRUCTIONS
Discussed recommendation for Sudafed. UpToDate states not recommended during first trimester and not for prolonged use.   Use of Sudafed short term for relief of sinus symptoms is reasonable. She can talk with her obstetrician tomorrow.     Drink plenty of water  Over the counter pain relievers okay   If symptoms do not improve in 3-5 days follow up with your primary care provider or urgent care  If symptoms worsen follow up with urgent care or the emergency room      Sinus Infection, Adult  A sinus infection is soreness and swelling (inflammation) of your sinuses. Sinuses are hollow spaces in the bones around your face. They are located:  Around your eyes.  In the middle of your forehead.  Behind your nose.  In your cheekbones.  Your sinuses and nasal passages are lined with a fluid called mucus. Mucus drains out of your sinuses. Swelling can trap mucus in your sinuses. This lets germs (bacteria, virus, or fungus) grow, which leads to infection. Most of the time, this condition is caused by a virus.  What are the causes?  Allergies.  Asthma.  Germs.  Things that block your nose or sinuses.  Growths in the nose (nasal polyps).  Chemicals or irritants in the air.  A fungus. This is rare.  What increases the risk?  Having a weak body defense system (immune system).  Doing a lot of swimming or diving.  Using nasal sprays too much.  Smoking.  What are the signs or symptoms?  The main symptoms of this condition are pain and a feeling of pressure around the sinuses. Other symptoms include:  Stuffy nose (congestion). This may make it hard to breathe through your nose.  Runny nose (drainage).  Soreness, swelling, and warmth in the sinuses.  A cough that may get worse at night.  Being unable to smell and taste.  Mucus that collects in the throat or the back of the nose (postnasal drip). This may cause a sore throat or bad breath.  Being very tired (fatigued).  A fever.  How is this diagnosed?  Your symptoms.  Your medical  history.  A physical exam.  Tests to find out if your condition is short-term (acute) or long-term (chronic). Your doctor may:  Check your nose for growths (polyps).  Check your sinuses using a tool that has a light on one end (endoscope).  Check for allergies or germs.  Do imaging tests, such as an MRI or CT scan.  How is this treated?  Treatment for this condition depends on the cause and whether it is short-term or long-term.  If caused by a virus, your symptoms should go away on their own within 10 days. You may be given medicines to relieve symptoms. They include:  Medicines that shrink swollen tissue in the nose.  A spray that treats swelling of the nostrils.  Rinses that help get rid of thick mucus in your nose (nasal saline washes).  Medicines that treat allergies (antihistamines).  Over-the-counter pain relievers.  If caused by bacteria, your doctor may wait to see if you will get better without treatment. You may be given antibiotic medicine if you have:  A very bad infection.  A weak body defense system.  If caused by growths in the nose, surgery may be needed.  Follow these instructions at home:  Medicines  Take, use, or apply over-the-counter and prescription medicines only as told by your doctor. These may include nasal sprays.  If you were prescribed an antibiotic medicine, take it as told by your doctor. Do not stop taking it even if you start to feel better.  Hydrate and humidify    Drink enough water to keep your pee (urine) pale yellow.  Use a cool mist humidifier to keep the humidity level in your home above 50%.  Breathe in steam for 10-15 minutes, 3-4 times a day, or as told by your doctor. You can do this in the bathroom while a hot shower is running.  Try not to spend time in cool or dry air.  Rest  Rest as much as you can.  Sleep with your head raised (elevated).  Make sure you get enough sleep each night.  General instructions    Put a warm, moist washcloth on your face 3-4 times a day, or  as often as told by your doctor.  Use nasal saline washes as often as told by your doctor.  Wash your hands often with soap and water. If you cannot use soap and water, use hand .  Do not smoke. Avoid being around people who are smoking (secondhand smoke).  Keep all follow-up visits.  Contact a doctor if:  You have a fever.  Your symptoms get worse.  Your symptoms do not get better within 10 days.  Get help right away if:  You have a very bad headache.  You cannot stop vomiting.  You have very bad pain or swelling around your face or eyes.  You have trouble seeing.  You feel confused.  Your neck is stiff.  You have trouble breathing.  These symptoms may be an emergency. Get help right away. Call 911.  Do not wait to see if the symptoms will go away.  Do not drive yourself to the hospital.  Summary  A sinus infection is swelling of your sinuses. Sinuses are hollow spaces in the bones around your face.  This condition is caused by tissues in your nose that become inflamed or swollen. This traps germs. These can lead to infection.  If you were prescribed an antibiotic medicine, take it as told by your doctor. Do not stop taking it even if you start to feel better.  Keep all follow-up visits.  This information is not intended to replace advice given to you by your health care provider. Make sure you discuss any questions you have with your health care provider.  Document Revised: 11/22/2022 Document Reviewed: 11/22/2022  [employer] Patient Education © 2024 [employer]

## 2024-05-19 NOTE — PROGRESS NOTES
CHIEF COMPLAINT  Chief Complaint   Patient presents with    Sinusitis         HPI  Chole Ugalde is a 29 y.o. female  presents with complaint of pregnant with sinusitis. She has been battling this for 2 weeks. She has not been able to take medications due to pregnancy. Her obstetrician did give her a list and stated she could take Sudafed, but the pharmacist told her not to take this.     Review of Systems   Constitutional:  Positive for fatigue. Negative for chills, diaphoresis and fever.   HENT:  Positive for congestion, postnasal drip, sinus pressure and sinus pain. Negative for sore throat.    Respiratory:  Positive for cough.    Neurological:  Positive for headaches.       Past Medical History:   Diagnosis Date    Duodenal ulcer     Migraines     Sepsis     VRE (vancomycin resistant enterococcus) culture positive 08/2013       Family History   Problem Relation Age of Onset    Hypertension Father     Diabetes Maternal Grandmother     Diabetes Paternal Grandmother     Diabetes Sister        Social History     Socioeconomic History    Marital status:    Tobacco Use    Smoking status: Never     Passive exposure: Never    Smokeless tobacco: Never   Vaping Use    Vaping status: Never Used   Substance and Sexual Activity    Alcohol use: Yes     Comment: occasional prior to pregnancy    Drug use: No    Sexual activity: Yes         LMP 10/25/2023 (Approximate)     PHYSICAL EXAM  Physical Exam   Constitutional: She is oriented to person, place, and time. She appears well-developed and well-nourished. She does not have a sickly appearance. She does not appear ill. No distress.   HENT:   Head: Normocephalic and atraumatic.   Nose: Right sinus exhibits maxillary sinus tenderness and frontal sinus tenderness. Left sinus exhibits maxillary sinus tenderness and frontal sinus tenderness.   Eyes: EOM are normal.   Neck: Neck normal appearance.  Pulmonary/Chest: Effort normal.  No respiratory distress.  Neurological:  She is alert and oriented to person, place, and time.   Skin: Skin is dry.   Psychiatric: She has a normal mood and affect.           Diagnoses and all orders for this visit:    1. Acute pansinusitis, recurrence not specified (Primary)    Other orders  -     amoxicillin (AMOXIL) 875 MG tablet; Take 1 tablet by mouth 2 (Two) Times a Day for 7 days.  Dispense: 14 tablet; Refill: 0    Discussed recommendation for Sudafed. UpToDate states not recommended during first trimester and not for prolonged use.   Use of Sudafed short term for relief of sinus symptoms is reasonable. She can talk with her obstetrician tomorrow.     The use of a video visit has been reviewed with the patient and verbal informed consent has been obtained. Myself and Chloe Ugalde participated in this visit. The patient is located in [address] [place] [address]. I am located in [place], [place]. Mychart and Twilio were utilized.       Note Disclaimer: At [facility], we believe that sharing information builds trust and better   relationships. You are receiving this note because you recently visited [facility]. It is possible you   will see health information before a provider has talked with you about it. This kind of information can   be easy to misunderstand. To help you fully understand what it means for your health, we urge you to   discuss this note with your provider.    Ellen Barnes, GENET  05/19/2024  11:43 EDT

## 2024-06-28 LAB — EXTERNAL GROUP B STREP ANTIGEN: NEGATIVE

## 2024-07-21 ENCOUNTER — HOSPITAL ENCOUNTER (EMERGENCY)
Facility: HOSPITAL | Age: 29
Discharge: HOME | End: 2024-07-21
Attending: OBSTETRICS & GYNECOLOGY | Admitting: OBSTETRICS & GYNECOLOGY
Payer: COMMERCIAL

## 2024-07-21 ENCOUNTER — HOSPITAL ENCOUNTER (INPATIENT)
Facility: HOSPITAL | Age: 29
LOS: 2 days | Discharge: HOME | End: 2024-07-23
Attending: OBSTETRICS & GYNECOLOGY | Admitting: STUDENT IN AN ORGANIZED HEALTH CARE EDUCATION/TRAINING PROGRAM
Payer: COMMERCIAL

## 2024-07-21 VITALS
SYSTOLIC BLOOD PRESSURE: 115 MMHG | WEIGHT: 236.8 LBS | HEART RATE: 83 BPM | OXYGEN SATURATION: 99 % | RESPIRATION RATE: 18 BRPM | BODY MASS INDEX: 37.17 KG/M2 | HEIGHT: 67 IN | DIASTOLIC BLOOD PRESSURE: 72 MMHG | TEMPERATURE: 97.9 F

## 2024-07-21 PROBLEM — Z34.90 PREGNANCY: Status: ACTIVE | Noted: 2024-07-21

## 2024-07-21 LAB
ABO GROUP BLD: NORMAL
ALBUMIN SERPL-MCNC: 3.6 G/DL (ref 3.5–5.2)
ALBUMIN/GLOB SERPL: 1.2 G/DL
ALP SERPL-CCNC: 159 U/L (ref 39–117)
ALT SERPL W P-5'-P-CCNC: 6 U/L (ref 1–33)
ANION GAP SERPL CALCULATED.3IONS-SCNC: 16 MMOL/L (ref 5–15)
AST SERPL-CCNC: 22 U/L (ref 1–32)
BACTERIA UR QL AUTO: ABNORMAL /HPF
BASOPHILS # BLD AUTO: 0.03 10*3/MM3 (ref 0–0.2)
BASOPHILS NFR BLD AUTO: 0.2 % (ref 0–1.5)
BILIRUB SERPL-MCNC: 0.2 MG/DL (ref 0–1.2)
BILIRUB UR QL STRIP: NEGATIVE
BLD GP AB SCN SERPL QL: NEGATIVE
BUN SERPL-MCNC: 8 MG/DL (ref 6–20)
BUN/CREAT SERPL: 13.3 (ref 7–25)
CALCIUM SPEC-SCNC: 8.9 MG/DL (ref 8.6–10.5)
CHLORIDE SERPL-SCNC: 104 MMOL/L (ref 98–107)
CLARITY UR: CLEAR
CO2 SERPL-SCNC: 14 MMOL/L (ref 22–29)
COLOR UR: YELLOW
CREAT SERPL-MCNC: 0.6 MG/DL (ref 0.57–1)
DEPRECATED RDW RBC AUTO: 43.2 FL (ref 37–54)
EGFRCR SERPLBLD CKD-EPI 2021: 124.8 ML/MIN/1.73
EOSINOPHIL # BLD AUTO: 0.1 10*3/MM3 (ref 0–0.4)
EOSINOPHIL NFR BLD AUTO: 0.7 % (ref 0.3–6.2)
ERYTHROCYTE [DISTWIDTH] IN BLOOD BY AUTOMATED COUNT: 13.9 % (ref 12.3–15.4)
GLOBULIN UR ELPH-MCNC: 3.1 GM/DL
GLUCOSE SERPL-MCNC: 107 MG/DL (ref 65–99)
GLUCOSE UR STRIP-MCNC: NEGATIVE MG/DL
HCT VFR BLD AUTO: 37.6 % (ref 34–46.6)
HGB BLD-MCNC: 12.7 G/DL (ref 12–15.9)
HGB UR QL STRIP.AUTO: NEGATIVE
HYALINE CASTS UR QL AUTO: ABNORMAL /LPF
IMM GRANULOCYTES # BLD AUTO: 0.06 10*3/MM3 (ref 0–0.05)
IMM GRANULOCYTES NFR BLD AUTO: 0.4 % (ref 0–0.5)
KETONES UR QL STRIP: NEGATIVE
LEUKOCYTE ESTERASE UR QL STRIP.AUTO: ABNORMAL
LYMPHOCYTES # BLD AUTO: 3.27 10*3/MM3 (ref 0.7–3.1)
LYMPHOCYTES NFR BLD AUTO: 22.4 % (ref 19.6–45.3)
MCH RBC QN AUTO: 29.1 PG (ref 26.6–33)
MCHC RBC AUTO-ENTMCNC: 33.8 G/DL (ref 31.5–35.7)
MCV RBC AUTO: 86 FL (ref 79–97)
MONOCYTES # BLD AUTO: 0.89 10*3/MM3 (ref 0.1–0.9)
MONOCYTES NFR BLD AUTO: 6.1 % (ref 5–12)
NEUTROPHILS NFR BLD AUTO: 10.24 10*3/MM3 (ref 1.7–7)
NEUTROPHILS NFR BLD AUTO: 70.2 % (ref 42.7–76)
NITRITE UR QL STRIP: NEGATIVE
NRBC BLD AUTO-RTO: 0 /100 WBC (ref 0–0.2)
PH UR STRIP.AUTO: 7 [PH] (ref 5–8)
PLATELET # BLD AUTO: 272 10*3/MM3 (ref 140–450)
PMV BLD AUTO: 11.2 FL (ref 6–12)
POTASSIUM SERPL-SCNC: 3.5 MMOL/L (ref 3.5–5.2)
PROT SERPL-MCNC: 6.7 G/DL (ref 6–8.5)
PROT UR QL STRIP: NEGATIVE
RBC # BLD AUTO: 4.37 10*6/MM3 (ref 3.77–5.28)
RBC # UR STRIP: ABNORMAL /HPF
REF LAB TEST METHOD: ABNORMAL
RH BLD: POSITIVE
SODIUM SERPL-SCNC: 134 MMOL/L (ref 136–145)
SP GR UR STRIP: 1.01 (ref 1–1.03)
SQUAMOUS #/AREA URNS HPF: ABNORMAL /HPF
T&S EXPIRATION DATE: NORMAL
UROBILINOGEN UR QL STRIP: ABNORMAL
WBC # UR STRIP: ABNORMAL /HPF
WBC NRBC COR # BLD AUTO: 14.59 10*3/MM3 (ref 3.4–10.8)

## 2024-07-21 PROCEDURE — 87086 URINE CULTURE/COLONY COUNT: CPT | Performed by: OBSTETRICS & GYNECOLOGY

## 2024-07-21 PROCEDURE — 81001 URINALYSIS AUTO W/SCOPE: CPT | Performed by: OBSTETRICS & GYNECOLOGY

## 2024-07-21 PROCEDURE — 80053 COMPREHEN METABOLIC PANEL: CPT | Performed by: STUDENT IN AN ORGANIZED HEALTH CARE EDUCATION/TRAINING PROGRAM

## 2024-07-21 PROCEDURE — 99284 EMERGENCY DEPT VISIT MOD MDM: CPT | Performed by: OBSTETRICS & GYNECOLOGY

## 2024-07-21 PROCEDURE — 86900 BLOOD TYPING SEROLOGIC ABO: CPT | Performed by: STUDENT IN AN ORGANIZED HEALTH CARE EDUCATION/TRAINING PROGRAM

## 2024-07-21 PROCEDURE — 86901 BLOOD TYPING SEROLOGIC RH(D): CPT | Performed by: STUDENT IN AN ORGANIZED HEALTH CARE EDUCATION/TRAINING PROGRAM

## 2024-07-21 PROCEDURE — 86850 RBC ANTIBODY SCREEN: CPT | Performed by: STUDENT IN AN ORGANIZED HEALTH CARE EDUCATION/TRAINING PROGRAM

## 2024-07-21 PROCEDURE — 59025 FETAL NON-STRESS TEST: CPT

## 2024-07-21 PROCEDURE — 85025 COMPLETE CBC W/AUTO DIFF WBC: CPT | Performed by: STUDENT IN AN ORGANIZED HEALTH CARE EDUCATION/TRAINING PROGRAM

## 2024-07-21 PROCEDURE — 25010000002 OXYTOCIN PER 10 UNITS

## 2024-07-21 PROCEDURE — 99202 OFFICE O/P NEW SF 15 MIN: CPT | Performed by: STUDENT IN AN ORGANIZED HEALTH CARE EDUCATION/TRAINING PROGRAM

## 2024-07-21 PROCEDURE — 0KQM0ZZ REPAIR PERINEUM MUSCLE, OPEN APPROACH: ICD-10-PCS | Performed by: STUDENT IN AN ORGANIZED HEALTH CARE EDUCATION/TRAINING PROGRAM

## 2024-07-21 RX ORDER — CARBOPROST TROMETHAMINE 250 UG/ML
250 INJECTION, SOLUTION INTRAMUSCULAR
Status: DISCONTINUED | OUTPATIENT
Start: 2024-07-21 | End: 2024-07-21 | Stop reason: HOSPADM

## 2024-07-21 RX ORDER — MISOPROSTOL 200 UG/1
600 TABLET ORAL ONCE AS NEEDED
Status: DISCONTINUED | OUTPATIENT
Start: 2024-07-21 | End: 2024-07-23 | Stop reason: HOSPADM

## 2024-07-21 RX ORDER — ONDANSETRON 2 MG/ML
4 INJECTION INTRAMUSCULAR; INTRAVENOUS EVERY 6 HOURS PRN
Status: DISCONTINUED | OUTPATIENT
Start: 2024-07-21 | End: 2024-07-21 | Stop reason: HOSPADM

## 2024-07-21 RX ORDER — DOCUSATE SODIUM 100 MG/1
100 CAPSULE, LIQUID FILLED ORAL DAILY
COMMUNITY
End: 2024-07-23 | Stop reason: HOSPADM

## 2024-07-21 RX ORDER — CARBOPROST TROMETHAMINE 250 UG/ML
INJECTION, SOLUTION INTRAMUSCULAR
Status: ACTIVE
Start: 2024-07-21 | End: 2024-07-22

## 2024-07-21 RX ORDER — MISOPROSTOL 200 UG/1
TABLET ORAL
Status: ACTIVE
Start: 2024-07-21 | End: 2024-07-22

## 2024-07-21 RX ORDER — CALCIUM CARBONATE 500 MG/1
2 TABLET, CHEWABLE ORAL 3 TIMES DAILY PRN
Status: DISCONTINUED | OUTPATIENT
Start: 2024-07-21 | End: 2024-07-23 | Stop reason: HOSPADM

## 2024-07-21 RX ORDER — SODIUM CHLORIDE, SODIUM LACTATE, POTASSIUM CHLORIDE, CALCIUM CHLORIDE 600; 310; 30; 20 MG/100ML; MG/100ML; MG/100ML; MG/100ML
125 INJECTION, SOLUTION INTRAVENOUS CONTINUOUS
Status: DISCONTINUED | OUTPATIENT
Start: 2024-07-21 | End: 2024-07-21

## 2024-07-21 RX ORDER — ONDANSETRON 4 MG/1
4 TABLET, ORALLY DISINTEGRATING ORAL EVERY 8 HOURS PRN
Status: DISCONTINUED | OUTPATIENT
Start: 2024-07-21 | End: 2024-07-23 | Stop reason: HOSPADM

## 2024-07-21 RX ORDER — ACETAMINOPHEN 325 MG/1
650 TABLET ORAL EVERY 6 HOURS PRN
Status: DISCONTINUED | OUTPATIENT
Start: 2024-07-21 | End: 2024-07-23 | Stop reason: HOSPADM

## 2024-07-21 RX ORDER — METHYLERGONOVINE MALEATE 0.2 MG/ML
INJECTION INTRAVENOUS
Status: ACTIVE
Start: 2024-07-21 | End: 2024-07-22

## 2024-07-21 RX ORDER — KETOROLAC TROMETHAMINE 15 MG/ML
15 INJECTION, SOLUTION INTRAMUSCULAR; INTRAVENOUS EVERY 6 HOURS PRN
Status: DISCONTINUED | OUTPATIENT
Start: 2024-07-21 | End: 2024-07-23 | Stop reason: HOSPADM

## 2024-07-21 RX ORDER — OXYTOCIN/0.9 % SODIUM CHLORIDE 30/500 ML
250 PLASTIC BAG, INJECTION (ML) INTRAVENOUS CONTINUOUS
Status: ACTIVE | OUTPATIENT
Start: 2024-07-21 | End: 2024-07-21

## 2024-07-21 RX ORDER — METHYLERGONOVINE MALEATE 0.2 MG/ML
200 INJECTION INTRAVENOUS ONCE AS NEEDED
Status: DISCONTINUED | OUTPATIENT
Start: 2024-07-21 | End: 2024-07-21 | Stop reason: HOSPADM

## 2024-07-21 RX ORDER — SODIUM CHLORIDE 0.9 % (FLUSH) 0.9 %
10 SYRINGE (ML) INJECTION EVERY 12 HOURS SCHEDULED
Status: DISCONTINUED | OUTPATIENT
Start: 2024-07-21 | End: 2024-07-21 | Stop reason: HOSPADM

## 2024-07-21 RX ORDER — TRANEXAMIC ACID 10 MG/ML
1000 INJECTION, SOLUTION INTRAVENOUS ONCE AS NEEDED
Status: DISCONTINUED | OUTPATIENT
Start: 2024-07-21 | End: 2024-07-21

## 2024-07-21 RX ORDER — PROMETHAZINE HYDROCHLORIDE 25 MG/1
25 TABLET ORAL EVERY 6 HOURS PRN
Status: DISCONTINUED | OUTPATIENT
Start: 2024-07-21 | End: 2024-07-23 | Stop reason: HOSPADM

## 2024-07-21 RX ORDER — TRANEXAMIC ACID 10 MG/ML
1000 INJECTION, SOLUTION INTRAVENOUS ONCE AS NEEDED
Status: DISCONTINUED | OUTPATIENT
Start: 2024-07-21 | End: 2024-07-23 | Stop reason: HOSPADM

## 2024-07-21 RX ORDER — MAGNESIUM CARB/ALUMINUM HYDROX 105-160MG
30 TABLET,CHEWABLE ORAL ONCE AS NEEDED
Status: DISCONTINUED | OUTPATIENT
Start: 2024-07-21 | End: 2024-07-21 | Stop reason: HOSPADM

## 2024-07-21 RX ORDER — FAMOTIDINE 20 MG/1
20 TABLET, FILM COATED ORAL 2 TIMES DAILY PRN
Status: DISCONTINUED | OUTPATIENT
Start: 2024-07-21 | End: 2024-07-21 | Stop reason: HOSPADM

## 2024-07-21 RX ORDER — METHYLERGONOVINE MALEATE 0.2 MG/ML
200 INJECTION INTRAVENOUS ONCE AS NEEDED
Status: DISCONTINUED | OUTPATIENT
Start: 2024-07-21 | End: 2024-07-23 | Stop reason: HOSPADM

## 2024-07-21 RX ORDER — OXYTOCIN 10 [USP'U]/ML
10 INJECTION, SOLUTION INTRAMUSCULAR; INTRAVENOUS ONCE
Status: COMPLETED | OUTPATIENT
Start: 2024-07-21 | End: 2024-07-21

## 2024-07-21 RX ORDER — LIDOCAINE HYDROCHLORIDE 10 MG/ML
0.5 INJECTION, SOLUTION INFILTRATION; PERINEURAL ONCE AS NEEDED
Status: DISCONTINUED | OUTPATIENT
Start: 2024-07-21 | End: 2024-07-21 | Stop reason: HOSPADM

## 2024-07-21 RX ORDER — HYDROCORTISONE 25 MG/G
CREAM TOPICAL AS NEEDED
Status: DISCONTINUED | OUTPATIENT
Start: 2024-07-21 | End: 2024-07-23 | Stop reason: HOSPADM

## 2024-07-21 RX ORDER — ONDANSETRON 2 MG/ML
4 INJECTION INTRAMUSCULAR; INTRAVENOUS EVERY 6 HOURS PRN
Status: DISCONTINUED | OUTPATIENT
Start: 2024-07-21 | End: 2024-07-23 | Stop reason: HOSPADM

## 2024-07-21 RX ORDER — TRANEXAMIC ACID 10 MG/ML
INJECTION, SOLUTION INTRAVENOUS
Status: ACTIVE
Start: 2024-07-21 | End: 2024-07-22

## 2024-07-21 RX ORDER — TRAMADOL HYDROCHLORIDE 50 MG/1
50 TABLET ORAL EVERY 6 HOURS PRN
Status: DISCONTINUED | OUTPATIENT
Start: 2024-07-21 | End: 2024-07-23 | Stop reason: HOSPADM

## 2024-07-21 RX ORDER — SODIUM CHLORIDE 0.9 % (FLUSH) 0.9 %
10 SYRINGE (ML) INJECTION AS NEEDED
Status: DISCONTINUED | OUTPATIENT
Start: 2024-07-21 | End: 2024-07-21 | Stop reason: HOSPADM

## 2024-07-21 RX ORDER — CETIRIZINE HYDROCHLORIDE 10 MG/1
10 TABLET ORAL DAILY
Status: DISCONTINUED | OUTPATIENT
Start: 2024-07-22 | End: 2024-07-23 | Stop reason: HOSPADM

## 2024-07-21 RX ORDER — OXYTOCIN 10 [USP'U]/ML
INJECTION, SOLUTION INTRAMUSCULAR; INTRAVENOUS
Status: COMPLETED
Start: 2024-07-21 | End: 2024-07-21

## 2024-07-21 RX ORDER — IBUPROFEN 600 MG/1
600 TABLET ORAL EVERY 6 HOURS PRN
Status: DISCONTINUED | OUTPATIENT
Start: 2024-07-21 | End: 2024-07-23 | Stop reason: HOSPADM

## 2024-07-21 RX ORDER — SODIUM CHLORIDE 9 MG/ML
40 INJECTION, SOLUTION INTRAVENOUS AS NEEDED
Status: DISCONTINUED | OUTPATIENT
Start: 2024-07-21 | End: 2024-07-21 | Stop reason: HOSPADM

## 2024-07-21 RX ORDER — LEVOTHYROXINE SODIUM 0.03 MG/1
25 TABLET ORAL
Status: DISCONTINUED | OUTPATIENT
Start: 2024-07-22 | End: 2024-07-23 | Stop reason: HOSPADM

## 2024-07-21 RX ORDER — MISOPROSTOL 200 UG/1
800 TABLET ORAL ONCE AS NEEDED
Status: DISCONTINUED | OUTPATIENT
Start: 2024-07-21 | End: 2024-07-21 | Stop reason: HOSPADM

## 2024-07-21 RX ORDER — BISACODYL 10 MG
10 SUPPOSITORY, RECTAL RECTAL DAILY PRN
Status: DISCONTINUED | OUTPATIENT
Start: 2024-07-22 | End: 2024-07-23 | Stop reason: HOSPADM

## 2024-07-21 RX ORDER — DOCUSATE SODIUM 100 MG/1
100 CAPSULE, LIQUID FILLED ORAL 2 TIMES DAILY
Status: DISCONTINUED | OUTPATIENT
Start: 2024-07-21 | End: 2024-07-23 | Stop reason: HOSPADM

## 2024-07-21 RX ORDER — FAMOTIDINE 10 MG/ML
20 INJECTION, SOLUTION INTRAVENOUS 2 TIMES DAILY PRN
Status: DISCONTINUED | OUTPATIENT
Start: 2024-07-21 | End: 2024-07-21 | Stop reason: HOSPADM

## 2024-07-21 RX ORDER — PROMETHAZINE HYDROCHLORIDE 12.5 MG/1
12.5 SUPPOSITORY RECTAL EVERY 6 HOURS PRN
Status: DISCONTINUED | OUTPATIENT
Start: 2024-07-21 | End: 2024-07-23 | Stop reason: HOSPADM

## 2024-07-21 RX ORDER — OXYTOCIN/0.9 % SODIUM CHLORIDE 30/500 ML
125 PLASTIC BAG, INJECTION (ML) INTRAVENOUS CONTINUOUS PRN
Status: DISCONTINUED | OUTPATIENT
Start: 2024-07-21 | End: 2024-07-23 | Stop reason: HOSPADM

## 2024-07-21 RX ORDER — ACETAMINOPHEN 325 MG/1
650 TABLET ORAL EVERY 4 HOURS PRN
Status: DISCONTINUED | OUTPATIENT
Start: 2024-07-21 | End: 2024-07-21 | Stop reason: HOSPADM

## 2024-07-21 RX ORDER — ONDANSETRON 4 MG/1
4 TABLET, ORALLY DISINTEGRATING ORAL EVERY 6 HOURS PRN
Status: DISCONTINUED | OUTPATIENT
Start: 2024-07-21 | End: 2024-07-21 | Stop reason: HOSPADM

## 2024-07-21 RX ORDER — TERBUTALINE SULFATE 1 MG/ML
0.25 INJECTION, SOLUTION SUBCUTANEOUS AS NEEDED
Status: DISCONTINUED | OUTPATIENT
Start: 2024-07-21 | End: 2024-07-21 | Stop reason: HOSPADM

## 2024-07-21 RX ORDER — LIDOCAINE HYDROCHLORIDE 10 MG/ML
10 INJECTION, SOLUTION EPIDURAL; INFILTRATION; INTRACAUDAL; PERINEURAL ONCE
Status: DISCONTINUED | OUTPATIENT
Start: 2024-07-21 | End: 2024-07-23 | Stop reason: HOSPADM

## 2024-07-21 RX ORDER — OXYTOCIN/0.9 % SODIUM CHLORIDE 30/500 ML
999 PLASTIC BAG, INJECTION (ML) INTRAVENOUS ONCE
Status: DISCONTINUED | OUTPATIENT
Start: 2024-07-21 | End: 2024-07-21 | Stop reason: HOSPADM

## 2024-07-21 RX ORDER — NALOXONE HCL 0.4 MG/ML
0.4 VIAL (ML) INJECTION
Status: DISCONTINUED | OUTPATIENT
Start: 2024-07-21 | End: 2024-07-21 | Stop reason: HOSPADM

## 2024-07-21 RX ORDER — HYDROXYZINE 50 MG/1
50 TABLET, FILM COATED ORAL NIGHTLY PRN
Status: DISCONTINUED | OUTPATIENT
Start: 2024-07-21 | End: 2024-07-23 | Stop reason: HOSPADM

## 2024-07-21 RX ORDER — MORPHINE SULFATE 2 MG/ML
1 INJECTION, SOLUTION INTRAMUSCULAR; INTRAVENOUS EVERY 4 HOURS PRN
Status: DISCONTINUED | OUTPATIENT
Start: 2024-07-21 | End: 2024-07-21 | Stop reason: HOSPADM

## 2024-07-21 RX ORDER — OXYTOCIN/0.9 % SODIUM CHLORIDE 30/500 ML
PLASTIC BAG, INJECTION (ML) INTRAVENOUS
Status: ACTIVE
Start: 2024-07-21 | End: 2024-07-22

## 2024-07-21 RX ADMIN — OXYTOCIN 10 UNITS: 10 INJECTION, SOLUTION INTRAMUSCULAR; INTRAVENOUS at 19:27

## 2024-07-21 RX ADMIN — Medication 250 ML/HR: at 19:32

## 2024-07-21 RX ADMIN — OXYTOCIN 10 UNITS: 10 INJECTION INTRAVENOUS at 19:27

## 2024-07-21 RX ADMIN — IBUPROFEN 600 MG: 600 TABLET, FILM COATED ORAL at 21:29

## 2024-07-21 NOTE — PROGRESS NOTES
[facility]  Obstetric History and Physical    Chief Complaint   Patient presents with    Contractions     NATA- contractions started this evening at 1900 and have progressively increased in in frequency and intensity. Currently they are 3-4min apart and rating 2-3/10., denies LOF or VB. +FM       Subjective     Patient is a 29 y.o. female  currently at 39w1d, who presents with complaints of contractions since 7 PM.  Patient states that contractions started and been getting progressively closer together and stronger so she came in for evaluation.  Patient scheduled for induction at 39 weeks due to favorable cervix at term.  She admits to active fetal movement.  She denies vaginal bleeding.  She denies leaking fluid.  She states her prenatal course has been essentially unremarkable..    Her prenatal care is benign.  Her previous obstetric/gynecological history is noted for is non-contributory.    The following portions of the patients history were reviewed and updated as appropriate: current medications, allergies, past medical history, past surgical history, past family history, past social history, and problem list .       Prenatal Information:  Prenatal Results       Initial Prenatal Labs       Test Value Reference Range Date Time    Hemoglobin        Hematocrit        Platelets        Rubella IgG        Hepatitis B SAg        Hepatitis C Ab        RPR        T. Pallidum Ab         ABO  O   21 0904    Rh  Positive   21 0904    Antibody Screen        HIV        Urine Culture        Gonorrhea        Chlamydia        TSH        HgB A1c         Varicella IgG        Hemoglobinopathy Fractionation        Hemoglobinopathy (genetic testing)        Cystic fibrosis                   Fetal testing        Test Value Reference Range Date Time    NIPT        MSAFP        AFP-4                  2nd and 3rd Trimester       Test Value Reference Range Date Time    Hemoglobin (repeated)        Hematocrit  (repeated)        Platelets   308 10*3/mm3 140 - 450 06/22/22 0727    1 hour GTT         Antibody Screen (repeated)        3rd TM syphilis scrn (repeated)  RPR         3rd TM syphilis scrn (repeated) TP-Ab        3rd TM syphilis screen TB-Ab (FTA)        Syphilis cascade test TP-Ab (EIA)        Syphilis cascade TPPA        GTT Fasting        GTT 1 Hr        GTT 2 Hr        GTT 3 Hr        Group B Strep                  Other testing        Test Value Reference Range Date Time    [place] IgG         CMV IgG                   Drug Screening       Test Value Reference Range Date Time    Amphetamine Screen        Barbiturate Screen        Benzodiazepine Screen        Methadone Screen        Phencyclidine Screen        Opiates Screen        THC Screen        Cocaine Screen        Propoxyphene Screen        Buprenorphine Screen        Methamphetamine Screen        Oxycodone Screen        Tricyclic Antidepressants Screen                  Legend    ^: Historical                          External Prenatal Results       Pregnancy Outside Results - Transcribed From Office Records - See Scanned Records For Details       Test Value Date Time    ABO  O  05/02/21 0904    Rh  Positive  05/02/21 0904    Antibody Screen       Varicella IgG       Rubella       Hgb       Hct       HgB A1c        1h GTT       3h GTT Fasting       3h GTT 1 hour       3h GTT 2 hour       3h GTT 3 hour        Gonorrhea (discrete)       Chlamydia (discrete)       RPR       Syphilis Antibody       HBsAg       Herpes Simplex Virus PCR       Herpes Simplex VIrus Culture       HIV       Hep C RNA Quant PCR       Hep C Antibody       AFP       NIPT       Cystic Fibroisis        Group B Strep       GBS Susceptibility to Clindamycin       GBS Susceptibility to Erythromycin       Fetal Fibronectin       Genetic Testing, Maternal Blood                 Drug Screening       Test Value Date Time    Urine Drug Screen       Amphetamine Screen       Barbiturate Screen        Benzodiazepine Screen       Methadone Screen       Phencyclidine Screen       Opiates Screen       THC Screen       Cocaine Screen       Propoxyphene Screen       Buprenorphine Screen       Methamphetamine Screen       Oxycodone Screen       Tricyclic Antidepressants Screen                 Legend    ^: Historical                             Past OB History:     OB History    Para Term  AB Living   2 1 1 0 0 1   SAB IAB Ectopic Molar Multiple Live Births   0 0 0 0 0 1      # Outcome Date GA Lbr Haroldo/2nd Weight Sex Type Anes PTL Lv   2 Current            1 Term 21 39w1d 06:24 / 01:07 3704 g (8 lb 2.7 oz) M Vag-Spont EPI N SATURNINO      Birth Comments: scale 2      Name: ANALI BYRNES      Apgar1: 9  Apgar5: 9         Latex   Past Medical History: Past Medical History:   Diagnosis Date    Duodenal ulcer     Migraines     Sepsis     VRE (vancomycin resistant enterococcus) culture positive 2013      Past Surgical History Past Surgical History:   Procedure Laterality Date    CHOLECYSTECTOMY  2013    EXPLORATORY LAPAROTOMY      REVISION OF ABDOMINAL SCAR      WISDOM TOOTH EXTRACTION        Family History: Family History   Problem Relation Age of Onset    Hypertension Father     Diabetes Maternal Grandmother     Diabetes Paternal Grandmother     Diabetes Sister       Social History:  reports that she has never smoked. She has never been exposed to tobacco smoke. She has never used smokeless tobacco.   reports that she does not currently use alcohol.   reports no history of drug use.        General ROS: Pertinent items are noted in HPI    Objective       Vital Signs Range for the last 24 hours  Temperature: Temp:  [97.9 °F (36.6 °C)] 97.9 °F (36.6 °C)   Temp Source: Temp src: Oral   BP: BP: (115-122)/(72-81) 115/72   Pulse: Heart Rate:  [82-87] 83   Respirations: Resp:  [18] 18   SPO2: SpO2:  [99 %] 99 %   O2 Amount (l/min):     O2 Devices Device (Oxygen Therapy): room air   Weight: Weight:  [107 kg  (236 lb 12.8 oz)] 107 kg (236 lb 12.8 oz)     Physical Examination: General appearance - alert, well appearing, and in no distress  Mental status - alert, oriented to person, place, and time  Chest - clear to auscultation, no wheezes, rales or rhonchi, symmetric air entry  Heart - normal rate, regular rhythm, normal S1, S2, no murmurs, rubs, clicks or gallops  Abdomen - soft, nontender, nondistended, no masses or organomegaly  Pelvic -3-4/60 per RN  Back exam - full range of motion, no tenderness, palpable spasm or pain on motion  Neurological - alert, oriented, normal speech, no focal findings or movement disorder noted  Extremities - peripheral pulses normal, no pedal edema, no clubbing or cyanosis  Skin - normal coloration and turgor, no rashes, no suspicious skin lesions noted    Presentation:    Cervix: Exam by: Method: sterile exam per RN   Dilation: Cervical Dilation (cm): 3-4   Effacement: Cervical Effacement: 60%   Station:         Fetal Heart Rate Assessment   Method:     Beats/min:     Baseline:     Variability:     Accels:     Decels:     Tracing Category:       Uterine Assessment   Method:     Frequency (min):     Ctx Count in 10 min:     Duration:     Intensity:                 [place] Units:         NST: Baseline 130s + accelerations, moderate variability, no decelerations+ contractions every 2 to 5 palpate mild RN category 1 tracing      Assessment & Plan       * No active hospital problems. *        Assessment:  1.  Intrauterine pregnancy at 39w1d gestation with reactive, reassuring fetal status.    2.  labor  without ROM and false  3.  Obstetrical history significant for is non-contributory.      Plan:  1. ambulate with intermittent monitoring and re-evaluate for cervical change in 2 hrs  2. Plan of care has been reviewed with patient and patient agrees.   3.  Risks, benefits of treatment plan have been discussed.  4.  All questions have been answered.        Emilee Mayes,  DO  7/21/2024  01:16 EDT

## 2024-07-21 NOTE — H&P
[facility]  Obstetric History and Physical    Patient Name: Chloe Byrnes  :  1995  MRN:  [id number]        No chief complaint on file.      Subjective     Patient is a 29 y.o. female  currently at 39w1d, who presents in active labor.           Her prenatal care is significant for no complications       Past Medical History: Past Medical History:   Diagnosis Date    Duodenal ulcer     Migraines     Sepsis     VRE (vancomycin resistant enterococcus) culture positive 2013      Past Surgical History Past Surgical History:   Procedure Laterality Date    CHOLECYSTECTOMY  2013    EXPLORATORY LAPAROTOMY      REVISION OF ABDOMINAL SCAR  2013    WISDOM TOOTH EXTRACTION        Family History: Family History   Problem Relation Age of Onset    Hypertension Father     Diabetes Maternal Grandmother     Diabetes Paternal Grandmother     Diabetes Sister       Social History:  reports that she has never smoked. She has never been exposed to tobacco smoke. She has never used smokeless tobacco.   reports that she does not currently use alcohol.   reports no history of drug use.    Allergies:     Latex     Past OB History:       OB History    Para Term  AB Living   2 1 1 0 0 1   SAB IAB Ectopic Molar Multiple Live Births   0 0 0 0 0 1      # Outcome Date GA Lbr Haroldo/2nd Weight Sex Type Anes PTL Lv   2 Current            1 Term 21 39w1d 06:24 / 01:07 3704 g (8 lb 2.7 oz) M Vag-Spont EPI N SATURNINO      Birth Comments: scale 2      Name: ANALI BYRNES      Apgar1: 9  Apgar5: 9         Objective       Vital Signs Range for the last 24 hours  Temperature: Temp:  [97.9 °F (36.6 °C)] 97.9 °F (36.6 °C)   Temp Source: Temp src: Oral   BP: BP: (115-122)/(72-81) 115/72   Pulse: Heart Rate:  [82-87] 83   Respirations: Resp:  [18] 18             Physical Exam:        General :    Alert and cooperative in NAD   Lungs:   Clear to auscultation bilaterally   CV:   Regular rate and rhythm   Abdomen:  Gravid,  non-tender, no masses   Vaginal exam: C/C/+2     LE:   no edema     FHR:   150/mod/+accel/no decels   [place]:   q2              A/P:  29 y.o.  at 39w1d who presented in active labor    Labor  Complete and ready to push upon arrival  Fetal status  Reassuring  GBS neg              Meenakshi Whitney MD  2024  19:44 EDT

## 2024-07-22 LAB
BACTERIA SPEC AEROBE CULT: NO GROWTH
BASOPHILS # BLD AUTO: 0.03 10*3/MM3 (ref 0–0.2)
BASOPHILS NFR BLD AUTO: 0.3 % (ref 0–1.5)
DEPRECATED RDW RBC AUTO: 42.7 FL (ref 37–54)
EOSINOPHIL # BLD AUTO: 0.13 10*3/MM3 (ref 0–0.4)
EOSINOPHIL NFR BLD AUTO: 1.1 % (ref 0.3–6.2)
ERYTHROCYTE [DISTWIDTH] IN BLOOD BY AUTOMATED COUNT: 13.9 % (ref 12.3–15.4)
HCT VFR BLD AUTO: 33.1 % (ref 34–46.6)
HGB BLD-MCNC: 11.1 G/DL (ref 12–15.9)
IMM GRANULOCYTES # BLD AUTO: 0.06 10*3/MM3 (ref 0–0.05)
IMM GRANULOCYTES NFR BLD AUTO: 0.5 % (ref 0–0.5)
LYMPHOCYTES # BLD AUTO: 2.76 10*3/MM3 (ref 0.7–3.1)
LYMPHOCYTES NFR BLD AUTO: 24.2 % (ref 19.6–45.3)
MCH RBC QN AUTO: 28.8 PG (ref 26.6–33)
MCHC RBC AUTO-ENTMCNC: 33.5 G/DL (ref 31.5–35.7)
MCV RBC AUTO: 86 FL (ref 79–97)
MONOCYTES # BLD AUTO: 0.91 10*3/MM3 (ref 0.1–0.9)
MONOCYTES NFR BLD AUTO: 8 % (ref 5–12)
NEUTROPHILS NFR BLD AUTO: 65.9 % (ref 42.7–76)
NEUTROPHILS NFR BLD AUTO: 7.5 10*3/MM3 (ref 1.7–7)
NRBC BLD AUTO-RTO: 0 /100 WBC (ref 0–0.2)
PLATELET # BLD AUTO: 231 10*3/MM3 (ref 140–450)
PMV BLD AUTO: 11.6 FL (ref 6–12)
RBC # BLD AUTO: 3.85 10*6/MM3 (ref 3.77–5.28)
TREPONEMA PALLIDUM IGG+IGM AB [PRESENCE] IN SERUM OR PLASMA BY IMMUNOASSAY: NORMAL
WBC NRBC COR # BLD AUTO: 11.39 10*3/MM3 (ref 3.4–10.8)

## 2024-07-22 PROCEDURE — 86780 TREPONEMA PALLIDUM: CPT | Performed by: STUDENT IN AN ORGANIZED HEALTH CARE EDUCATION/TRAINING PROGRAM

## 2024-07-22 PROCEDURE — 85025 COMPLETE CBC W/AUTO DIFF WBC: CPT | Performed by: STUDENT IN AN ORGANIZED HEALTH CARE EDUCATION/TRAINING PROGRAM

## 2024-07-22 RX ADMIN — IBUPROFEN 600 MG: 600 TABLET, FILM COATED ORAL at 13:09

## 2024-07-22 RX ADMIN — DOCUSATE SODIUM 100 MG: 100 CAPSULE, LIQUID FILLED ORAL at 08:42

## 2024-07-22 RX ADMIN — IBUPROFEN 600 MG: 600 TABLET, FILM COATED ORAL at 07:03

## 2024-07-22 RX ADMIN — IBUPROFEN 600 MG: 600 TABLET, FILM COATED ORAL at 20:33

## 2024-07-22 RX ADMIN — CETIRIZINE HYDROCHLORIDE 10 MG: 10 TABLET ORAL at 08:42

## 2024-07-22 RX ADMIN — DOCUSATE SODIUM 100 MG: 100 CAPSULE, LIQUID FILLED ORAL at 20:33

## 2024-07-22 RX ADMIN — LEVOTHYROXINE SODIUM 25 MCG: 25 TABLET ORAL at 07:03

## 2024-07-22 NOTE — PROGRESS NOTES
[facility]  Vaginal Delivery Progress Note    Patient Name: Chloe Ugalde  :  1995  MRN:  [id number]      Subjective   Postpartum Day 1: Vaginal Delivery of a male infant.     The patient feels well without complaints.  Her pain is well controlled.  Reports normal lochia.     The patient plans to bottle feed    Objective     Vital Signs Range for the last 24 hours  Temperature: Temp:  [98 °F (36.7 °C)-98.3 °F (36.8 °C)] 98.1 °F (36.7 °C)       BP: BP: (110-139)/(69-89) 112/74   Pulse: Heart Rate:  [] 80   Respirations: Resp:  [16-17] 17                       Physical Exam:  General: Awake and alert  Abdomen: Fundus: firm, non tender, 2fb below umbilicus  Extremities:  trace edema, NT     Labs:     Results from last 7 days   Lab Units 24  0747 24  1933   WBC 10*3/mm3 11.39* 14.59*   HEMOGLOBIN g/dL 11.1* 12.7   HEMATOCRIT % 33.1* 37.6   PLATELETS 10*3/mm3 231 272       Prenatal labs results reviewed:  Yes   Rubella:  immune  Rh Status:    RH type   Date Value Ref Range Status   2024 Positive  Final         Assessment & Plan  :     1. PPD1 S/P  - Doing well, continue usual cares.     2. Male infant: Doing well; desire circumcision        Isabel Perez, APRN  2024  09:51 EDT

## 2024-07-23 VITALS
HEIGHT: 67 IN | BODY MASS INDEX: 37.17 KG/M2 | WEIGHT: 236.8 LBS | RESPIRATION RATE: 16 BRPM | SYSTOLIC BLOOD PRESSURE: 119 MMHG | TEMPERATURE: 98 F | HEART RATE: 85 BPM | DIASTOLIC BLOOD PRESSURE: 76 MMHG

## 2024-07-23 PROBLEM — Z34.90 PREGNANCY: Status: RESOLVED | Noted: 2024-07-21 | Resolved: 2024-07-23

## 2024-07-23 RX ORDER — IBUPROFEN 600 MG/1
600 TABLET ORAL EVERY 6 HOURS PRN
Qty: 40 TABLET | Refills: 0 | Status: SHIPPED | OUTPATIENT
Start: 2024-07-23

## 2024-07-23 RX ORDER — PSEUDOEPHEDRINE HCL 30 MG
100 TABLET ORAL 2 TIMES DAILY PRN
Qty: 60 CAPSULE | Refills: 0 | Status: SHIPPED | OUTPATIENT
Start: 2024-07-23

## 2024-07-23 RX ADMIN — LEVOTHYROXINE SODIUM 25 MCG: 25 TABLET ORAL at 06:42

## 2024-07-23 RX ADMIN — CETIRIZINE HYDROCHLORIDE 10 MG: 10 TABLET ORAL at 08:40

## 2024-07-23 RX ADMIN — DOCUSATE SODIUM 100 MG: 100 CAPSULE, LIQUID FILLED ORAL at 08:40

## 2024-07-23 RX ADMIN — IBUPROFEN 600 MG: 600 TABLET, FILM COATED ORAL at 08:40

## 2024-07-23 NOTE — DISCHARGE SUMMARY
Date of Discharge:  2024    Discharge Diagnosis: Pregnancy s/p vaginal delivery    Presenting Problem/History of Present Illness  Pregnancy [Z34.90]       Hospital Course  Patient is a 29 y.o. female presented with active labor at 39w1d. On 24,  by Dr. Whitney of male infant weighing 8lb 7.2oz. Her post-partum course was uncomplicated and on PPD 2 she was meeting all criteria for discharge including adequate pain control, minimal lochia, and ability to ambulate, void and tolerate PO intake without difficulty. She was discharged home with standard discharge precautions and instructions.       Procedures Performed     Vaginal Delivery    Consults:   Consults       No orders found from 2024 to 2024.            Condition on Discharge:   Subjective   Postpartum Day 2 Vaginal Delivery.    The patient feels well without complaints.    Vital Signs  Temp:  [98 °F (36.7 °C)-98.2 °F (36.8 °C)] 98 °F (36.7 °C)  Heart Rate:  [82-87] 85  Resp:  [16-17] 16  BP: (119-131)/(65-76) 119/76    Physical Exam:   General: Awake and alert   Abdomen: Fundus: firm, non tender    Extremities:  Calves NT bilaterally    Assessment & Plan     PPD2  S/P : Stable for discharge. Instructions reviewed      Discharge Disposition  Home or Self Care    Discharge Medications     Discharge Medications        New Medications        Instructions Start Date   ibuprofen 600 MG tablet  Commonly known as: ADVIL,MOTRIN   600 mg, Oral, Every 6 Hours PRN             Changes to Medications        Instructions Start Date   docusate sodium 100 MG capsule  What changed:   when to take this  reasons to take this   100 mg, Oral, 2 Times Daily PRN             Continue These Medications        Instructions Start Date   cetirizine 10 MG tablet  Commonly known as: zyrTEC   10 mg, Oral, Daily      levothyroxine 25 MCG tablet  Commonly known as: SYNTHROID, LEVOTHROID   25 mcg, Oral, Daily      prenatal vitamin 27-0.8 27-0.8 MG tablet tablet    Oral, Daily                 Activity at Discharge: restrictions reviewed    Follow-up Appointments    Telehealth: 2 weeks  Postpartum Exam: 6 weeks      Test Results Pending at Discharge       GENET Navarrete  07/23/24  08:40 EDT

## 2025-07-30 ENCOUNTER — TRANSCRIBE ORDERS (OUTPATIENT)
Dept: ADMINISTRATIVE | Facility: HOSPITAL | Age: 30
End: 2025-07-30
Payer: COMMERCIAL

## 2025-07-30 DIAGNOSIS — R59.0 AXILLARY LYMPHADENOPATHY: Primary | ICD-10-CM

## 2025-08-11 ENCOUNTER — HOSPITAL ENCOUNTER (OUTPATIENT)
Dept: ULTRASOUND IMAGING | Facility: HOSPITAL | Age: 30
Discharge: HOME | End: 2025-08-11
Admitting: NURSE PRACTITIONER
Payer: COMMERCIAL

## 2025-08-11 DIAGNOSIS — R59.0 AXILLARY LYMPHADENOPATHY: ICD-10-CM

## 2025-08-11 PROCEDURE — 76882 US LMTD JT/FCL EVL NVASC XTR: CPT
